# Patient Record
Sex: MALE | Race: BLACK OR AFRICAN AMERICAN | NOT HISPANIC OR LATINO | URBAN - METROPOLITAN AREA
[De-identification: names, ages, dates, MRNs, and addresses within clinical notes are randomized per-mention and may not be internally consistent; named-entity substitution may affect disease eponyms.]

---

## 2023-08-07 ENCOUNTER — OUTPATIENT (OUTPATIENT)
Dept: OUTPATIENT SERVICES | Facility: HOSPITAL | Age: 48
LOS: 1 days | Discharge: HOME | End: 2023-08-07

## 2023-08-07 VITALS
RESPIRATION RATE: 17 BRPM | TEMPERATURE: 98 F | HEART RATE: 64 BPM | OXYGEN SATURATION: 98 % | DIASTOLIC BLOOD PRESSURE: 86 MMHG | HEIGHT: 71 IN | SYSTOLIC BLOOD PRESSURE: 132 MMHG | WEIGHT: 173.28 LBS

## 2023-08-07 DIAGNOSIS — K40.90 UNILATERAL INGUINAL HERNIA, WITHOUT OBSTRUCTION OR GANGRENE, NOT SPECIFIED AS RECURRENT: ICD-10-CM

## 2023-08-07 DIAGNOSIS — Z01.818 ENCOUNTER FOR OTHER PREPROCEDURAL EXAMINATION: ICD-10-CM

## 2023-08-07 DIAGNOSIS — K40.30 UNILATERAL INGUINAL HERNIA, WITH OBSTRUCTION, WITHOUT GANGRENE, NOT SPECIFIED AS RECURRENT: ICD-10-CM

## 2023-08-07 DIAGNOSIS — Z01.812 ENCOUNTER FOR PREPROCEDURAL LABORATORY EXAMINATION: ICD-10-CM

## 2023-08-07 LAB
ANION GAP SERPL CALC-SCNC: 5 MMOL/L — SIGNIFICANT CHANGE UP (ref 5–17)
BASOPHILS # BLD AUTO: 0.03 K/UL — SIGNIFICANT CHANGE UP (ref 0–0.2)
BASOPHILS NFR BLD AUTO: 0.5 % — SIGNIFICANT CHANGE UP (ref 0–2)
BUN SERPL-MCNC: 14 MG/DL — SIGNIFICANT CHANGE UP (ref 7–23)
CALCIUM SERPL-MCNC: 8.6 MG/DL — SIGNIFICANT CHANGE UP (ref 8.5–10.1)
CHLORIDE SERPL-SCNC: 107 MMOL/L — SIGNIFICANT CHANGE UP (ref 96–108)
CO2 SERPL-SCNC: 29 MMOL/L — SIGNIFICANT CHANGE UP (ref 22–31)
CREAT SERPL-MCNC: 0.92 MG/DL — SIGNIFICANT CHANGE UP (ref 0.5–1.3)
EGFR: 102 ML/MIN/1.73M2 — SIGNIFICANT CHANGE UP
EOSINOPHIL # BLD AUTO: 0.12 K/UL — SIGNIFICANT CHANGE UP (ref 0–0.5)
EOSINOPHIL NFR BLD AUTO: 1.8 % — SIGNIFICANT CHANGE UP (ref 0–6)
GLUCOSE SERPL-MCNC: 95 MG/DL — SIGNIFICANT CHANGE UP (ref 70–99)
HCT VFR BLD CALC: 43.6 % — SIGNIFICANT CHANGE UP (ref 39–50)
HGB BLD-MCNC: 15.8 G/DL — SIGNIFICANT CHANGE UP (ref 13–17)
IMM GRANULOCYTES NFR BLD AUTO: 0.3 % — SIGNIFICANT CHANGE UP (ref 0–0.9)
LYMPHOCYTES # BLD AUTO: 2.24 K/UL — SIGNIFICANT CHANGE UP (ref 1–3.3)
LYMPHOCYTES # BLD AUTO: 33.6 % — SIGNIFICANT CHANGE UP (ref 13–44)
MCHC RBC-ENTMCNC: 27.3 PG — SIGNIFICANT CHANGE UP (ref 27–34)
MCHC RBC-ENTMCNC: 36.2 G/DL — HIGH (ref 32–36)
MCV RBC AUTO: 75.3 FL — LOW (ref 80–100)
MONOCYTES # BLD AUTO: 0.44 K/UL — SIGNIFICANT CHANGE UP (ref 0–0.9)
MONOCYTES NFR BLD AUTO: 6.6 % — SIGNIFICANT CHANGE UP (ref 2–14)
NEUTROPHILS # BLD AUTO: 3.81 K/UL — SIGNIFICANT CHANGE UP (ref 1.8–7.4)
NEUTROPHILS NFR BLD AUTO: 57.2 % — SIGNIFICANT CHANGE UP (ref 43–77)
NRBC # BLD: 0 /100 WBCS — SIGNIFICANT CHANGE UP (ref 0–0)
PLATELET # BLD AUTO: 199 K/UL — SIGNIFICANT CHANGE UP (ref 150–400)
POTASSIUM SERPL-MCNC: 3.9 MMOL/L — SIGNIFICANT CHANGE UP (ref 3.5–5.3)
POTASSIUM SERPL-SCNC: 3.9 MMOL/L — SIGNIFICANT CHANGE UP (ref 3.5–5.3)
RBC # BLD: 5.79 M/UL — SIGNIFICANT CHANGE UP (ref 4.2–5.8)
RBC # FLD: 14.4 % — SIGNIFICANT CHANGE UP (ref 10.3–14.5)
SODIUM SERPL-SCNC: 141 MMOL/L — SIGNIFICANT CHANGE UP (ref 135–145)
WBC # BLD: 6.66 K/UL — SIGNIFICANT CHANGE UP (ref 3.8–10.5)
WBC # FLD AUTO: 6.66 K/UL — SIGNIFICANT CHANGE UP (ref 3.8–10.5)

## 2023-08-07 NOTE — H&P PST ADULT - NSANTHOSAYNRD_GEN_A_CORE
states that he was screened for sleep apnea as a /No. RADHA screening performed.  STOP BANG Legend: 0-2 = LOW Risk; 3-4 = INTERMEDIATE Risk; 5-8 = HIGH Risk

## 2023-08-07 NOTE — H&P PST ADULT - HISTORY OF PRESENT ILLNESS
Mr Bernardo is a 47 y/o male with hx of BPH ? s/p urolift not on any medications presents with left groin discomfort for pre op examination for  repair incarcerated  left inguinal hernia with mesh on 8/21/2023 with Dr. Pryor.

## 2023-08-07 NOTE — H&P PST ADULT - PROBLEM SELECTOR PLAN 1
scheduled for surgery repair left inguinal hernia with mesh on 8/21/2023  advice to go to ER if groin discomfort worsens.

## 2023-08-07 NOTE — H&P PST ADULT - ASSESSMENT
Mr Bernardo is a 47 y/o male with hx of BPH ? s/p urolift not on any medications presents with left groin discomfort for pre op examination for  repair incarcerated  left inguinal hernia with mesh on 2023 with Dr. Pryor.     CAPRINI SCORE    AGE RELATED RISK FACTORS                                                       MOBILITY RELATED FACTORS  [x ] Age 41-60 years                                            (1 Point)                  [ ] Bed rest                                                        (1 Point)  [ ] Age: 61-74 years                                           (2 Points)                [ ] Plaster cast                                                   (2 Points)  [ ] Age= 75 years                                              (3 Points)                 [ ] Bed bound for more than 72 hours                   (2 Points)    DISEASE RELATED RISK FACTORS                                               GENDER SPECIFIC FACTORS  [ ] Edema in the lower extremities                       (1 Point)                  [ ] Pregnancy                                                     (1 Point)  [ ] Varicose veins                                               (1 Point)                  [ ] Post-partum < 6 weeks                                   (1 Point)             [ ] BMI > 25 Kg/m2                                            (1 Point)                  [ ] Hormonal therapy  or oral contraception            (1 Point)                 [ ] Sepsis (in the previous month)                        (1 Point)                  [ ] History of pregnancy complications  [ ] Pneumonia or serious lung disease                                               [ ] Unexplained or recurrent                       (1 Point)           (in the previous month)                               (1 Point)  [ ] Abnormal pulmonary function test                     (1 Point)                 SURGERY RELATED RISK FACTORS  [ ] Acute myocardial infarction                              (1 Point)                 [ ]  Section                                            (1 Point)  [ ] Congestive heart failure (in the previous month)  (1 Point)                 [ ] Minor surgery                                                 (1 Point)   [ ] Inflammatory bowel disease                             (1 Point)                 [ ] Arthroscopic surgery                                        (2 Points)  [ ] Central venous access                                    (2 Points)                [ x] General surgery lasting more than 45 minutes   (2 Points)       [ ] Stroke (in the previous month)                          (5 Points)               [ ] Elective arthroplasty                                        (5 Points)                                                                                                                                               HEMATOLOGY RELATED FACTORS                                                 TRAUMA RELATED RISK FACTORS  [ ] Prior episodes of VTE                                     (3 Points)                 [ ] Fracture of the hip, pelvis, or leg                       (5 Points)  [ ] Positive family history for VTE                         (3 Points)                 [ ] Acute spinal cord injury (in the previous month)  (5 Points)  [ ] Prothrombin 77064 A                                      (3 Points)                 [ ] Paralysis  (less than 1 month)                          (5 Points)  [ ] Factor V Leiden                                             (3 Points)                 [ ] Multiple Trauma within 1 month                         (5 Points)  [ ] Lupus anticoagulants                                     (3 Points)                                                           [ ] Anticardiolipin antibodies                                (3 Points)                                                       [ ] High homocysteine in the blood                      (3 Points)                                             [ ] Other congenital or acquired thrombophilia       (3 Points)                                                [ ] Heparin induced thrombocytopenia                  (3 Points)                                          Total Score [     3     ]    Caprini Score 0-2: Low risk, No VTE Prophylaxis required for most patient's, encourage ambulation  Caprini Score 3-6: At Risk, Pharmacologic VTE prophylaxis is indicated for most patients ( in the absence of a contraindication)  Caprini Score Greater than or = 7: High Risk , pharmacologic VTE is indicated for most patients ( in the absence of a contraindication)    Caprini score indicates that the patient is high risk for VTE event ( score 6 or greater). Surgical patient's in this group will benefit from both pharmacologic prophylaxis and intermittent compression devices . Surgical team will determine the balance between VTE  risk and bleeding risk and other clinical considerations

## 2023-08-08 RX ORDER — SODIUM CHLORIDE 9 MG/ML
3 INJECTION INTRAMUSCULAR; INTRAVENOUS; SUBCUTANEOUS EVERY 8 HOURS
Refills: 0 | Status: DISCONTINUED | OUTPATIENT
Start: 2023-08-21 | End: 2023-08-24

## 2023-08-20 ENCOUNTER — TRANSCRIPTION ENCOUNTER (OUTPATIENT)
Age: 48
End: 2023-08-20

## 2023-08-21 ENCOUNTER — TRANSCRIPTION ENCOUNTER (OUTPATIENT)
Age: 48
End: 2023-08-21

## 2023-08-21 ENCOUNTER — INPATIENT (INPATIENT)
Facility: HOSPITAL | Age: 48
LOS: 2 days | Discharge: HOME | End: 2023-08-24
Attending: STUDENT IN AN ORGANIZED HEALTH CARE EDUCATION/TRAINING PROGRAM | Admitting: STUDENT IN AN ORGANIZED HEALTH CARE EDUCATION/TRAINING PROGRAM
Payer: COMMERCIAL

## 2023-08-21 VITALS
RESPIRATION RATE: 18 BRPM | TEMPERATURE: 99 F | SYSTOLIC BLOOD PRESSURE: 126 MMHG | DIASTOLIC BLOOD PRESSURE: 82 MMHG | WEIGHT: 173.28 LBS | HEART RATE: 69 BPM | OXYGEN SATURATION: 97 % | HEIGHT: 71 IN

## 2023-08-21 DIAGNOSIS — Z90.79 ACQUIRED ABSENCE OF OTHER GENITAL ORGAN(S): Chronic | ICD-10-CM

## 2023-08-21 LAB
ANION GAP SERPL CALC-SCNC: 5 MMOL/L — SIGNIFICANT CHANGE UP (ref 5–17)
BUN SERPL-MCNC: 11 MG/DL — SIGNIFICANT CHANGE UP (ref 7–23)
CALCIUM SERPL-MCNC: 8.7 MG/DL — SIGNIFICANT CHANGE UP (ref 8.5–10.1)
CHLORIDE SERPL-SCNC: 108 MMOL/L — SIGNIFICANT CHANGE UP (ref 96–108)
CO2 SERPL-SCNC: 27 MMOL/L — SIGNIFICANT CHANGE UP (ref 22–31)
CREAT SERPL-MCNC: 0.98 MG/DL — SIGNIFICANT CHANGE UP (ref 0.5–1.3)
EGFR: 95 ML/MIN/1.73M2 — SIGNIFICANT CHANGE UP
GAS PNL BLDA: SIGNIFICANT CHANGE UP
GLUCOSE SERPL-MCNC: 138 MG/DL — HIGH (ref 70–99)
HCT VFR BLD CALC: 43.1 % — SIGNIFICANT CHANGE UP (ref 39–50)
HGB BLD-MCNC: 15 G/DL — SIGNIFICANT CHANGE UP (ref 13–17)
MAGNESIUM SERPL-MCNC: 2 MG/DL — SIGNIFICANT CHANGE UP (ref 1.6–2.6)
MCHC RBC-ENTMCNC: 26.5 PG — LOW (ref 27–34)
MCHC RBC-ENTMCNC: 34.8 G/DL — SIGNIFICANT CHANGE UP (ref 32–36)
MCV RBC AUTO: 76 FL — LOW (ref 80–100)
NRBC # BLD: 0 /100 WBCS — SIGNIFICANT CHANGE UP (ref 0–0)
PHOSPHATE SERPL-MCNC: 2.8 MG/DL — SIGNIFICANT CHANGE UP (ref 2.5–4.5)
PLATELET # BLD AUTO: 173 K/UL — SIGNIFICANT CHANGE UP (ref 150–400)
POTASSIUM SERPL-MCNC: 3.9 MMOL/L — SIGNIFICANT CHANGE UP (ref 3.5–5.3)
POTASSIUM SERPL-SCNC: 3.9 MMOL/L — SIGNIFICANT CHANGE UP (ref 3.5–5.3)
RBC # BLD: 5.67 M/UL — SIGNIFICANT CHANGE UP (ref 4.2–5.8)
RBC # FLD: 14.2 % — SIGNIFICANT CHANGE UP (ref 10.3–14.5)
SODIUM SERPL-SCNC: 140 MMOL/L — SIGNIFICANT CHANGE UP (ref 135–145)
WBC # BLD: 16.82 K/UL — HIGH (ref 3.8–10.5)
WBC # FLD AUTO: 16.82 K/UL — HIGH (ref 3.8–10.5)

## 2023-08-21 PROCEDURE — 71045 X-RAY EXAM CHEST 1 VIEW: CPT | Mod: 26

## 2023-08-21 PROCEDURE — ZZZZZ: CPT

## 2023-08-21 PROCEDURE — 99223 1ST HOSP IP/OBS HIGH 75: CPT

## 2023-08-21 PROCEDURE — 76604 US EXAM CHEST: CPT | Mod: 26

## 2023-08-21 PROCEDURE — 99253 IP/OBS CNSLTJ NEW/EST LOW 45: CPT | Mod: 25

## 2023-08-21 PROCEDURE — 93308 TTE F-UP OR LMTD: CPT | Mod: 26

## 2023-08-21 PROCEDURE — 71250 CT THORAX DX C-: CPT | Mod: 26

## 2023-08-21 PROCEDURE — 88302 TISSUE EXAM BY PATHOLOGIST: CPT | Mod: 26

## 2023-08-21 DEVICE — MESH HERNIA INGUINAL PARIETEX PROGRIP RECTANGLE 15 X 9CM: Type: IMPLANTABLE DEVICE | Site: LEFT | Status: FUNCTIONAL

## 2023-08-21 RX ORDER — ACETAMINOPHEN 500 MG
650 TABLET ORAL EVERY 6 HOURS
Refills: 0 | Status: DISCONTINUED | OUTPATIENT
Start: 2023-08-21 | End: 2023-08-24

## 2023-08-21 RX ORDER — IPRATROPIUM/ALBUTEROL SULFATE 18-103MCG
3 AEROSOL WITH ADAPTER (GRAM) INHALATION ONCE
Refills: 0 | Status: COMPLETED | OUTPATIENT
Start: 2023-08-21 | End: 2023-08-21

## 2023-08-21 RX ORDER — FUROSEMIDE 40 MG
40 TABLET ORAL ONCE
Refills: 0 | Status: COMPLETED | OUTPATIENT
Start: 2023-08-21 | End: 2023-08-21

## 2023-08-21 RX ORDER — HEPARIN SODIUM 5000 [USP'U]/ML
5000 INJECTION INTRAVENOUS; SUBCUTANEOUS EVERY 12 HOURS
Refills: 0 | Status: DISCONTINUED | OUTPATIENT
Start: 2023-08-21 | End: 2023-08-24

## 2023-08-21 RX ORDER — HYDROMORPHONE HYDROCHLORIDE 2 MG/ML
0.5 INJECTION INTRAMUSCULAR; INTRAVENOUS; SUBCUTANEOUS
Refills: 0 | Status: DISCONTINUED | OUTPATIENT
Start: 2023-08-21 | End: 2023-08-21

## 2023-08-21 RX ORDER — HYDROMORPHONE HYDROCHLORIDE 2 MG/ML
1 INJECTION INTRAMUSCULAR; INTRAVENOUS; SUBCUTANEOUS
Refills: 0 | Status: DISCONTINUED | OUTPATIENT
Start: 2023-08-21 | End: 2023-08-21

## 2023-08-21 RX ORDER — SODIUM CHLORIDE 9 MG/ML
1000 INJECTION, SOLUTION INTRAVENOUS
Refills: 0 | Status: DISCONTINUED | OUTPATIENT
Start: 2023-08-21 | End: 2023-08-21

## 2023-08-21 RX ORDER — OXYCODONE HYDROCHLORIDE 5 MG/1
5 TABLET ORAL EVERY 4 HOURS
Refills: 0 | Status: DISCONTINUED | OUTPATIENT
Start: 2023-08-21 | End: 2023-08-24

## 2023-08-21 RX ORDER — ONDANSETRON 8 MG/1
4 TABLET, FILM COATED ORAL ONCE
Refills: 0 | Status: DISCONTINUED | OUTPATIENT
Start: 2023-08-21 | End: 2023-08-21

## 2023-08-21 RX ORDER — FUROSEMIDE 40 MG
40 TABLET ORAL
Refills: 0 | Status: DISCONTINUED | OUTPATIENT
Start: 2023-08-22 | End: 2023-08-22

## 2023-08-21 RX ADMIN — SODIUM CHLORIDE 125 MILLILITER(S): 9 INJECTION, SOLUTION INTRAVENOUS at 11:40

## 2023-08-21 RX ADMIN — SODIUM CHLORIDE 3 MILLILITER(S): 9 INJECTION INTRAMUSCULAR; INTRAVENOUS; SUBCUTANEOUS at 22:00

## 2023-08-21 RX ADMIN — Medication 3 MILLILITER(S): at 12:39

## 2023-08-21 RX ADMIN — Medication 40 MILLIGRAM(S): at 16:27

## 2023-08-21 RX ADMIN — HEPARIN SODIUM 5000 UNIT(S): 5000 INJECTION INTRAVENOUS; SUBCUTANEOUS at 18:03

## 2023-08-21 NOTE — ASU DISCHARGE PLAN (ADULT/PEDIATRIC) - CARE PROVIDER_API CALL
Valerie Pryor  Surgery  [address] [place] [address]  Phone: [phone]  Fax: [phone]  Follow Up Time: 2 weeks

## 2023-08-21 NOTE — CONSULT NOTE ADULT - ASSESSMENT
48 yrs old male with PMH of BPH presented for inguinal hernia repair. Post op pt was hypoxic and was found to have pulm edema in CXR.    Acute hypoxic respiratory failure   2/2 acute pulmonary edema:  - Admit to tele  - CXR with BL infiltrate  - Can get CT chest for better assessment  - Lasix IVP once stat, continue 40 mg BID  - Stop IVF   - Fluid and salt restriction, daily wt  - 2D Echo  - On NC, taper down as tolerated     S/P inguinal hernia repair:  - Management sx team    DVT ppx per primary team
48M non smoker with PMH BPH, R inguinal hernia repair here s/p left inguinal hernia repair with post op course complicated by hypoxia (O2 sat 88%) and CXR with bilateral infiltrates.     POCUS   [x] limited echo  [x] limited lung    INDICATIONS  [x] SOB  [x] hypoxia    FINDINGS  - EF appears within normal limits, no pericardial effusion, unable to get good visualization of IVC  - bilateral a line predominance anteriorly, bilateral lung sliding, no pleural effusions noted      DX: acute post operative hypoxic respiratory failure    - POD #0  - CXR with bilateral opacities noted  - he did not have respiratory complaints prior to surgery and was oxygenating well   - he may have had while coming out of anesthesia possible laryngospasm and subsequent negative pressure pulmonary edema leading to SOB and hypoxia  - he does not appear ill or toxic appearing  - weaned off nasal cannula to room air upon during exam with O2 sat mid 90s on room air  - not in respiratory distress, no accessory muscle use  - lung ultrasound with no significant signs of interstitial edema/fluid  - there are no prior CXR to compare to  - he received lasix in PACU with improvement of symptoms and now weaned off O2 supplementation  - monitor I/O  - can give another dose of lasix if urine output decreasing  - pending CT chest and echo  - suspect pulmonary edema with sudden onset of SOB/hypoxia and now with relatively quick resolution of symptoms post diuresis   - would repeat CXR tomorrow   - can check sputum culture, RVP to rule out potential infectious etiologies of pulmonary opacities  - goal to maintain O2 sat > 90%  - incentive spirometer to prevent post op atelectasis  - DVT ppx

## 2023-08-21 NOTE — PROGRESS NOTE ADULT - SUBJECTIVE AND OBJECTIVE BOX
Post-op check    S/P left inguinal hernia repair with mesh POD#0  Pt seen and examined at bedside. Admits to incisional pain well controlled with medication. States that his breathing is improving. Voiding. Ambulating. Tolerating small portion of regular diet. Denies chest pain, shortness of breath, nausea/ vomiting, and dizziness.     Vital Signs Last 24 Hrs  T(F): 98.4 (08-21-23 @ 20:03), Max: 98.6 (08-21-23 @ 07:55)  HR: 69 (08-21-23 @ 20:03)  BP: 143/85 (08-21-23 @ 20:03)  RR: 18 (08-21-23 @ 20:03)  SpO2: 99% (08-21-23 @ 20:03)      CONSTITUTIONAL: Alert, NAD  RESPIRATORY: Clear to auscultation bilaterally, respirations nonlabored  CARDIOVASCULAR: S1S2, Regular rate and rhythm  GASTROINTESTINAL: Dressing C/D/I, Nondistended, bowel sounds, soft, Appropriate incisional tenderness  MUSCULOSKELETAL: No calf tenderness, No edema                          15.0   16.82 )-----------( 173      ( 21 Aug 2023 14:30 )             43.1   08-21    140  |  108  |  11  ----------------------------<  138<H>  3.9   |  27  |  0.98    Ca    8.7      21 Aug 2023 14:30  Phos  2.8     08-21  Mg     2.0     08-21        < from: CT Chest No Cont (08.21.23 @ 19:41) >  FINDINGS:    AIRWAYS, LUNGS, PLEURA: Clear central airways. Consolidation ground-glass   opacification involving all lung lobes. No pleural effusion.    MEDIASTINUM: Normal heart size. No pericardial effusion. Thoracic aorta   normal caliber.  No large mediastinal lymph nodes.    IMAGED ABDOMEN: Multiple hepatic lesions are indeterminate; reference   left hepatic lobe 5.5 cm lesion (image 126, series 7).    SOFT TISSUES: Unremarkable.    BONES: Unremarkable.      IMPRESSION:.    Extensive airspace disease involving all lung lobes; leading diagnostic   consideration is for infection.    Indeterminate hepatic lesions; recommend dedicated MRI abdomen to further   characterize.    < end of copied text >    < from: Xray Chest 1 View-PORTABLE IMMEDIATE (Xray Chest 1 View-PORTABLE IMMEDIATE .) (08.21.23 @ 13:46) >  IMPRESSION: Fairly advanced bilateral infiltrates.    < end of copied text >        Assessment: 48M S/P left inguinal hernia repair with mesh POD#0. Post-op pulmonary edema.     Plan:  - Local wound care  - DVT prophylaxis, Incentive Spirometer, OOB, Ambulating, pain control  - f/u labs   - Continue current management per medicine  - Follow up pulmonology recommendations   - No abx needed  - D/w Dr. Pryor

## 2023-08-21 NOTE — CONSULT NOTE ADULT - SUBJECTIVE AND OBJECTIVE BOX
HPI:  48M non smoker PMH R inguinal hernia repair, BPH, presents for L inguinal hernia repair. Post op pt became SOB. Denies fever, chills, sick contacts. States he felt his throat was tight and became short of breath and was "coughing alot". Denies prior lung problems, no hx of asthma. No respiratory complaints prior to surgery. CXR with bilateral infiltrates. O2 sat 88%. Was given lasix and placed on NRB -> nasal cannula 4L O2 supplementation. Currently reports feeling better and with decreased SOB.     No CP, no palpitations. No prior cardiac hx  No reported emesis post op      Allergies  No Known Allergies      MEDICATIONS  (STANDING):  heparin   Injectable 5000 Unit(s) SubCutaneous every 12 hours  sodium chloride 0.9% lock flush 3 milliLiter(s) IV Push every 8 hours    MEDICATIONS  (PRN):  acetaminophen     Tablet .. 650 milliGRAM(s) Oral every 6 hours PRN Temp greater or equal to 38C (100.4F), Mild Pain (1 - 3)  HYDROmorphone  Injectable 0.5 milliGRAM(s) IV Push every 10 minutes PRN Moderate Pain (4 - 6)  HYDROmorphone  Injectable 1 milliGRAM(s) IV Push every 10 minutes PRN Severe Pain (7 - 10)  ondansetron Injectable 4 milliGRAM(s) IV Push once PRN Nausea and/or Vomiting  oxyCODONE    IR 5 milliGRAM(s) Oral every 4 hours PRN Moderate Pain (4 - 6)        Daily Height in cm: 180.34 (21 Aug 2023 17:22)        Drug Dosing Weight  Height (cm): 180.3 (21 Aug 2023 17:22)  Weight (kg): 75.7 (21 Aug 2023 17:22)  BMI (kg/m2): 23.3 (21 Aug 2023 17:22)  BSA (m2): 1.95 (21 Aug 2023 17:22)    PAST MEDICAL & SURGICAL HISTORY:  Enlarged prostate  S/P TURP    R inguinal hernia repair      FAMILY HISTORY:  HTN    SOCIAL HISTORY:  no smoking  no alcohol  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 No nausea, vomiting, or hematemesis; No diarrhea or constipation. No melena or hematochezia.  GENITOURINARY: No dysuria, frequency, incontinence  NEUROLOGICAL: No headaches, memory loss, loss of strength, numbness, or tremors  SKIN: No itching, burning, rashes, or lesions   LYMPH NODES: No enlarged glands  ENDOCRINE: No heat or cold intolerance; No hair loss  MUSCULOSKELETAL: No joint pain or swelling; No muscle, back, or extremity pain  PSYCHIATRIC: No depression, anxiety, mood swings, or difficulty sleeping  HEME/LYMPH: No easy bruising, or bleeding gums  ALLERGY AND IMMUNOLOGIC: No hives or eczema          Vital Signs Last 24 Hrs  T(C): 36.7 (21 Aug 2023 17:22), Max: 37 (21 Aug 2023 07:55)  T(F): 98 (21 Aug 2023 17:22), Max: 98.6 (21 Aug 2023 07:55)  HR: 89 (21 Aug 2023 17:46) (69 - 96)  BP: 128/72 (21 Aug 2023 17:22) (114/78 - 144/86)  RR: 20 (21 Aug 2023 17:22) (14 - 28)  SpO2: 94% (21 Aug 2023 17:56) (88% - 100%)    Parameters below as of 21 Aug 2023 17:56  Patient On (Oxygen Delivery Method): room air            ABG - ( 21 Aug 2023 13:37 )  pH, Arterial: 7.39  /  pCO2: 46    /  pO2: 92    / HCO3: 28    / Base Excess: 2.2   /  SaO2: 99.2            PHYSICAL EXAM:  GENERAL: NAD, comfortable in bed, well-developed  HEAD:  Atraumatic, Normocephalic  EYES: EOMI, conjunctiva and sclera clear  ENMT: No tonsillar erythema, exudates, or enlargement; Moist mucous membranes, No lesions  NECK: Supple, No JVD  NERVOUS SYSTEM:  Alert & Oriented X3, Good concentration; Motor Strength 5/5 B/L upper and lower extremities  CHEST/LUNG: Clear to auscultation bilaterally; No rales, rhonchi, wheezing  HEART: Regular rate and rhythm  ABDOMEN: Soft, Nondistended; Bowel sounds present, L groin with dressing in place  EXTREMITIES:  2+ Peripheral Pulses, No clubbing, cyanosis, or edema  SKIN: No rashes or lesions    LABS:                          15.0   16.82 )-----------( 173      ( 21 Aug 2023 14:30 )             43.1       08-21    140  |  108  |  11  ----------------------------<  138<H>  3.9   |  27  |  0.98    Ca    8.7      21 Aug 2023 14:30  Phos  2.8     08-21  Mg     2.0     08-21            RADIOLOGY:  CXR < from: Xray Chest 1 View-PORTABLE IMMEDIATE (Xray Chest 1 View-PORTABLE IMMEDIATE .) (08.21.23 @ 13:46) >  There are significant bilateral infiltrates throughout most of the lungs.    IMPRESSION: Fairly advanced bilateral infiltrates.          
HPI:  48 yrs old male with PMH of BPH presented for inguinal hernia repair. Post op pt was hypoxic and was found to have pulm edema in CXR.  Pt was admitted under sx service. Medicine was consulted for co management.  Pt denies any known cardiac and pulm history.    PAST MEDICAL & SURGICAL HISTORY:  No pertinent past medical history      Enlarged prostate      S/P TURP          REVIEW OF SYSTEMS:    CONSTITUTIONAL: No fever, weight loss, or fatigue  EYES: No eye pain, visual disturbances, or discharge  ENMT:  No difficulty hearing, tinnitus, vertigo; No sinus or throat pain  NECK: No pain or stiffness  BREASTS: No pain, masses, or nipple discharge  RESPIRATORY: + No shortness of breath, + orthopnea  CARDIOVASCULAR: No chest pain, palpitations, dizziness, or leg swelling  GASTROINTESTINAL: + surgical site soreness.  No nausea, vomiting, or hematemesis; No diarrhea or constipation. No melena or hematochezia.  GENITOURINARY: No dysuria, frequency, hematuria, or incontinence  NEUROLOGICAL: No headaches, memory loss, loss of strength, numbness, or tremors  SKIN: No itching, burning, rashes, or lesions   LYMPH NODES: No enlarged glands  ENDOCRINE: No heat or cold intolerance; No hair loss  MUSCULOSKELETAL: No joint pain or swelling; No muscle, back, or extremity pain  PSYCHIATRIC: No depression, anxiety, mood swings, or difficulty sleeping  HEME/LYMPH: No easy bruising, or bleeding gums  ALLERGY AND IMMUNOLOGIC: No hives or eczema      MEDICATIONS  (STANDING):  heparin   Injectable 5000 Unit(s) SubCutaneous every 12 hours  sodium chloride 0.9% lock flush 3 milliLiter(s) IV Push every 8 hours    MEDICATIONS  (PRN):  acetaminophen     Tablet .. 650 milliGRAM(s) Oral every 6 hours PRN Temp greater or equal to 38C (100.4F), Mild Pain (1 - 3)  HYDROmorphone  Injectable 0.5 milliGRAM(s) IV Push every 10 minutes PRN Moderate Pain (4 - 6)  HYDROmorphone  Injectable 1 milliGRAM(s) IV Push every 10 minutes PRN Severe Pain (7 - 10)  ondansetron Injectable 4 milliGRAM(s) IV Push once PRN Nausea and/or Vomiting  oxyCODONE    IR 5 milliGRAM(s) Oral every 4 hours PRN Moderate Pain (4 - 6)      Allergies    No Known Allergies    Intolerances        SOCIAL HISTORY:    FAMILY HISTORY:      Vital Signs Last 24 Hrs  T(C): 36.7 (21 Aug 2023 17:22), Max: 37 (21 Aug 2023 07:55)  T(F): 98 (21 Aug 2023 17:22), Max: 98.6 (21 Aug 2023 07:55)  HR: 89 (21 Aug 2023 17:46) (69 - 96)  BP: 128/72 (21 Aug 2023 17:22) (114/78 - 144/86)  BP(mean): --  RR: 20 (21 Aug 2023 17:22) (14 - 28)  SpO2: 94% (21 Aug 2023 17:56) (88% - 100%)    Parameters below as of 21 Aug 2023 17:56  Patient On (Oxygen Delivery Method): room air        PHYSICAL EXAM:    GENERAL: NAD  HEAD:  Atraumatic  EYES: PERRLA  ENMT: Mouth moist   NECK: Supple  NERVOUS SYSTEM: Awake , alert  CHEST/LUNG: + crackles  HEART: RRR  ABDOMEN: Soft, non tender  EXTREMITIES: no edema    SKIN: No rashes or lesions      LABS:                        15.0   16.82 )-----------( 173      ( 21 Aug 2023 14:30 )             43.1     08-21    140  |  108  |  11  ----------------------------<  138<H>  3.9   |  27  |  0.98    Ca    8.7      21 Aug 2023 14:30  Phos  2.8     08-21  Mg     2.0     08-21        Urinalysis Basic - ( 21 Aug 2023 14:30 )    Color: x / Appearance: x / SG: x / pH: x  Gluc: 138 mg/dL / Ketone: x  / Bili: x / Urobili: x   Blood: x / Protein: x / Nitrite: x   Leuk Esterase: x / RBC: x / WBC x   Sq Epi: x / Non Sq Epi: x / Bacteria: x        RADIOLOGY & ADDITIONAL STUDIES:

## 2023-08-21 NOTE — PATIENT PROFILE ADULT - FALL HARM RISK - HARM RISK INTERVENTIONS

## 2023-08-22 ENCOUNTER — TRANSCRIPTION ENCOUNTER (OUTPATIENT)
Age: 48
End: 2023-08-22

## 2023-08-22 LAB
ANION GAP SERPL CALC-SCNC: 9 MMOL/L — SIGNIFICANT CHANGE UP (ref 5–17)
BUN SERPL-MCNC: 15 MG/DL — SIGNIFICANT CHANGE UP (ref 7–23)
CALCIUM SERPL-MCNC: 9.1 MG/DL — SIGNIFICANT CHANGE UP (ref 8.5–10.1)
CHLORIDE SERPL-SCNC: 103 MMOL/L — SIGNIFICANT CHANGE UP (ref 96–108)
CO2 SERPL-SCNC: 26 MMOL/L — SIGNIFICANT CHANGE UP (ref 22–31)
CREAT SERPL-MCNC: 1.16 MG/DL — SIGNIFICANT CHANGE UP (ref 0.5–1.3)
EGFR: 77 ML/MIN/1.73M2 — SIGNIFICANT CHANGE UP
GLUCOSE SERPL-MCNC: 102 MG/DL — HIGH (ref 70–99)
HCT VFR BLD CALC: 42.5 % — SIGNIFICANT CHANGE UP (ref 39–50)
HGB BLD-MCNC: 15 G/DL — SIGNIFICANT CHANGE UP (ref 13–17)
MAGNESIUM SERPL-MCNC: 2.2 MG/DL — SIGNIFICANT CHANGE UP (ref 1.6–2.6)
MCHC RBC-ENTMCNC: 26.8 PG — LOW (ref 27–34)
MCHC RBC-ENTMCNC: 35.3 G/DL — SIGNIFICANT CHANGE UP (ref 32–36)
MCV RBC AUTO: 76 FL — LOW (ref 80–100)
NRBC # BLD: 0 /100 WBCS — SIGNIFICANT CHANGE UP (ref 0–0)
NT-PROBNP SERPL-SCNC: 32 PG/ML — SIGNIFICANT CHANGE UP (ref 0–125)
PHOSPHATE SERPL-MCNC: 3.7 MG/DL — SIGNIFICANT CHANGE UP (ref 2.5–4.5)
PLATELET # BLD AUTO: 168 K/UL — SIGNIFICANT CHANGE UP (ref 150–400)
POTASSIUM SERPL-MCNC: 3.5 MMOL/L — SIGNIFICANT CHANGE UP (ref 3.5–5.3)
POTASSIUM SERPL-SCNC: 3.5 MMOL/L — SIGNIFICANT CHANGE UP (ref 3.5–5.3)
RAPID RVP RESULT: SIGNIFICANT CHANGE UP
RBC # BLD: 5.59 M/UL — SIGNIFICANT CHANGE UP (ref 4.2–5.8)
RBC # FLD: 14.3 % — SIGNIFICANT CHANGE UP (ref 10.3–14.5)
SARS-COV-2 RNA SPEC QL NAA+PROBE: SIGNIFICANT CHANGE UP
SODIUM SERPL-SCNC: 138 MMOL/L — SIGNIFICANT CHANGE UP (ref 135–145)
SURGICAL PATHOLOGY STUDY: SIGNIFICANT CHANGE UP
WBC # BLD: 12.69 K/UL — HIGH (ref 3.8–10.5)
WBC # FLD AUTO: 12.69 K/UL — HIGH (ref 3.8–10.5)

## 2023-08-22 PROCEDURE — 99233 SBSQ HOSP IP/OBS HIGH 50: CPT

## 2023-08-22 PROCEDURE — 71045 X-RAY EXAM CHEST 1 VIEW: CPT | Mod: 26

## 2023-08-22 RX ADMIN — OXYCODONE HYDROCHLORIDE 5 MILLIGRAM(S): 5 TABLET ORAL at 21:00

## 2023-08-22 RX ADMIN — HEPARIN SODIUM 5000 UNIT(S): 5000 INJECTION INTRAVENOUS; SUBCUTANEOUS at 17:32

## 2023-08-22 RX ADMIN — OXYCODONE HYDROCHLORIDE 5 MILLIGRAM(S): 5 TABLET ORAL at 10:36

## 2023-08-22 RX ADMIN — SODIUM CHLORIDE 3 MILLILITER(S): 9 INJECTION INTRAMUSCULAR; INTRAVENOUS; SUBCUTANEOUS at 22:31

## 2023-08-22 RX ADMIN — OXYCODONE HYDROCHLORIDE 5 MILLIGRAM(S): 5 TABLET ORAL at 09:36

## 2023-08-22 RX ADMIN — OXYCODONE HYDROCHLORIDE 5 MILLIGRAM(S): 5 TABLET ORAL at 20:06

## 2023-08-22 RX ADMIN — Medication 40 MILLIGRAM(S): at 13:50

## 2023-08-22 RX ADMIN — Medication 40 MILLIGRAM(S): at 05:09

## 2023-08-22 RX ADMIN — HEPARIN SODIUM 5000 UNIT(S): 5000 INJECTION INTRAVENOUS; SUBCUTANEOUS at 05:08

## 2023-08-22 NOTE — DISCHARGE NOTE PROVIDER - NSDCCPCAREPLAN_GEN_ALL_CORE_FT
PRINCIPAL DISCHARGE DIAGNOSIS  Diagnosis: Left inguinal hernia  Assessment and Plan of Treatment:

## 2023-08-22 NOTE — DISCHARGE NOTE PROVIDER - NSDCFUADDINST_GEN_ALL_CORE_FT
Activity as tolerated. Rest as needed. Do not lift anything heavier than 10 pounds. Do not drive while taking narcotic pain medication. Call for any fever over 101, nausea, vomiting, severe pain, no passing of gas or bowel movement. You may shower and pat dry abdomen. Leave the white steri strips in place; they will fall off in 5-7 days.

## 2023-08-22 NOTE — PROGRESS NOTE ADULT - SUBJECTIVE AND OBJECTIVE BOX
24 hr events:  doing well  decreased SOB  decreased cough  no sputum production  no fever no chills  was weaned to RA yesterday  reportedly O2 sat dropped to 92% yesterday night and was placed back on nasal cannula supplementation  placed on RA today O2 sat 98% at rest    ## ROS:  no fever, no chills  no HA, no dizziness  no visual changes, no auditory changes  + sore throat, no sinus congestion  decreased SOB, decreased (non productive) cough  no chest pain, no palpitations  no abdominal pain, no N/V/D  no dysuria, no hematuria  no myalgias, no arthralgias  no swelling  no rashes, no pruritis    ## Labs:  CBC:                        15.0   12.69 )-----------( 168      ( 22 Aug 2023 06:47 )             42.5     Chem:  08-22    138  |  103  |  15  ----------------------------<  102<H>  3.5   |  26  |  1.16    Ca    9.1      22 Aug 2023 06:47  Phos  3.7     08-22  Mg     2.2     08-22      ## Imaging:  CXR < from: Xray Chest 1 View-PORTABLE IMMEDIATE (Xray Chest 1 View-PORTABLE IMMEDIATE .) (08.21.23 @ 13:46) >  Heart magnified by technique.    There are significant bilateral infiltrates throughout most of the lungs.    IMPRESSION: Fairly advanced bilateral infiltrates.    -------------------  < from: CT Chest No Cont (08.21.23 @ 19:41) >  AIRWAYS, LUNGS, PLEURA: Clear central airways. Consolidation ground-glass   opacification involving all lung lobes. No pleural effusion.    MEDIASTINUM: Normal heart size. No pericardial effusion. Thoracic aorta   normal caliber.  No large mediastinal lymph nodes.    IMAGED ABDOMEN: Multiple hepatic lesions are indeterminate; reference   left hepatic lobe 5.5 cm lesion (image 126, series 7).    SOFT TISSUES: Unremarkable.    BONES: Unremarkable.      IMPRESSION:.    Extensive airspace disease involving all lung lobes; leading diagnostic   consideration is for infection.    Indeterminate hepatic lesions; recommend dedicated MRI abdomen to further   characterize.    --------------------------    < from: TTE Echo Complete w/o Contrast w/ Doppler (08.22.23 @ 13:05) >  Left Ventricle: The left ventricular internal cavity size is normal. Left   ventricular wall thickness is mildly increased. Increased relative wall   thickness with normal mass index consistent with left ventricular   concentric remodeling.  Global LV systolic function was normal. Leftventricular ejection   fraction, by visual estimation, is 55 to 60%. Spectral Doppler shows   impaired relaxation pattern of left ventricular myocardial filling (Grade   I diastolic dysfunction).  Right Ventricle: Normal right ventricular size and function.  Left Atrium: Normal left atrial size.  Right Atrium: Normal right atrial size.  Pericardium: There is no evidence of pericardial effusion.  Mitral Valve: Structurally normal mitral valve, with normal leaflet   excursion. Mitral leaflet mobility is normal. Trace mitral valve   regurgitation is seen.  Tricuspid Valve: Structurally normal tricuspid valve, with normal leaflet   excursion. Trivial tricuspid regurgitation is visualized. Estimated   pulmonary artery systolic pressure is 36.8 mmHg assuming a right atrial   pressure of 5 mmHg, which is consistent with borderline pulmonary   hypertension.  Aortic Valve: The aortic valve was not well visualized. The aortic valve   is trileaflet. Mild aortic valve regurgitation is seen.  PulmonicValve: The pulmonic valve was not well visualized. Trace   pulmonic valve regurgitation.  Aorta: Aortic root measured at Sinus of Valsalva is normal.  Venous: The inferior vena cava was normal sized, with respiratory size   variation greater than 50%.      Summary:   1. Left ventricular ejection fraction, by visual estimation, is 55 to 60%.   2. Technically adequate study.   3. Normal global left ventricular systolic function.   4. Mildly increased LV wall thickness.   5. Normal left ventricular internal cavity size.   6. Spectral Doppler shows impaired relaxation pattern of left ventricular myocardial filling (Grade I diastolic dysfunction).   7. Normal right ventricular size and function.   8. Normal left atrial size.   9. Normal right atrial size.  10. There is no evidence of pericardial effusion.  11. Structurally normal mitral valve, with normal leaflet excursion.  12. Trace mitral valve regurgitation.  13. Mild aortic regurgitation.  14. Estimated pulmonary artery systolic pressure is 36.8 mmHg assuming a   right atrial pressure of 5 mmHg, which is consistent with borderline   pulmonary hypertension.  15. Increased relative wall thickness with normal mass index consistent with left ventricular concentric remodeling.      ## Medications:  heparin   Injectable 5000 Unit(s) SubCutaneous every 12 hours      acetaminophen     Tablet .. 650 milliGRAM(s) Oral every 6 hours PRN  oxyCODONE    IR 5 milliGRAM(s) Oral every 4 hours PRN      ## Vitals:  T(C): 36.6 (08-22-23 @ 16:22), Max: 37 (08-21-23 @ 18:44)  HR: 71 (08-22-23 @ 16:22) (69 - 89)  BP: 133/85 (08-22-23 @ 16:22) (120/80 - 143/85)  RR: 18 (08-22-23 @ 16:22) (16 - 20)  SpO2: 95% (08-22-23 @ 16:22) (93% - 99%)    ABG: ABG - ( 21 Aug 2023 13:37 )  pH, Arterial: 7.39  /  pCO2: 46    /  pO2: 92    / HCO3: 28    / Base Excess: 2.2   /  SaO2: 99.2                  08-21 @ 07:01  -  08-22 @ 07:00  --------------------------------------------------------  IN: 0 mL / OUT: 1650 mL / NET: -1650 mL            ## P/E:  Gen: lying comfortably in bed in no apparent distress  HEENT: NC/AT, EOMI, sclera white  Resp: CTA B/L , no wheeze, no rhonchi  CVS: RRR  Abd: soft NT/ND +BS, L groin dressing in place  Ext: no c/c/e  Neuro: A&Ox3

## 2023-08-22 NOTE — DISCHARGE NOTE PROVIDER - NSDCCONDITION_GEN_ALL_CORE
Stable
most likely cellulitis of the left side of the face no fluctuance + induration; discussed with pt and wife in detail of starting antibiotics 1st see if symptoms improve and follow up in 2 days with either pmd for back to the ed to make sure area getting better; less likely abscess at this time as no fluctuance vs maybe very early abscess --offered ct of face -pt and wife would like a trial of abx first and note if will get worse will come back for ct at that time, no systemic symptoms at this time no fever. no lesions to indicate shingles but does complain of scalp sensitivity --understands to come back if any lesions develop. --dc

## 2023-08-22 NOTE — PROGRESS NOTE ADULT - SUBJECTIVE AND OBJECTIVE BOX
Patient seen and  examined at bedside resting comfortably.   Offers no complaints at this time, states he is breathing normally. Off NC.   Tolerating regular diet. With bowel function.   Denies fever, chills, N/V/D, CP, SOB.     Vital Signs Last 24 Hrs  T(F): 97.6 (08-22-23 @ 10:18), Max: 98.6 (08-21-23 @ 18:44)  HR: 70 (08-22-23 @ 10:18)  BP: 124/85 (08-22-23 @ 10:18)  RR: 16 (08-22-23 @ 10:18)  SpO2: 97% (08-22-23 @ 10:18)    PHYSICAL EXAM:  GENERAL: Alert, NAD  CHEST/LUNG: Clear to auscultation bilaterally, respirations nonlabored  HEART: Regular rate and rhythm; S1 & S2 appreciated  ABDOMEN: + Bowel sounds, soft, Nontender, softly distended. LIH dressing in place   EXTREMITIES:  no calf tenderness, No edema    I&O's Detail    21 Aug 2023 07:01  -  22 Aug 2023 07:00  --------------------------------------------------------  IN:  Total IN: 0 mL    OUT:    Voided (mL): 1650 mL  Total OUT: 1650 mL    Total NET: -1650 mL      22 Aug 2023 07:01  -  22 Aug 2023 12:43  --------------------------------------------------------  IN:  Total IN: 0 mL    OUT:    Voided (mL): 1000 mL  Total OUT: 1000 mL    Total NET: -1000 mL    LABS:                        15.0   12.69 )-----------( 168      ( 22 Aug 2023 06:47 )             42.5     08-22    138  |  103  |  15  ----------------------------<  102<H>  3.5   |  26  |  1.16    Ca    9.1      22 Aug 2023 06:47  Phos  3.7     08-22  Mg     2.2     08-22      RADIOLOGY & ADDITIONAL STUDIES:  < from: CT Chest No Cont (08.21.23 @ 19:41) >  ACC: [id number] EXAM:  CT CHEST   ORDERED BY: AMIRA FONTENOT     PROCEDURE DATE:  08/21/2023          INTERPRETATION:  HISTORY: Rule out pulmonary edema. Infiltrate on chest   x-ray.    EXAMINATION: CT CHEST was performed without IV contrast.    COMPARISON: No prior CT chest comparison.    FINDINGS:    AIRWAYS, LUNGS, PLEURA: Clear central airways. Consolidation ground-glass   opacification involving all lung lobes. No pleural effusion.    MEDIASTINUM: Normal heart size. No pericardial effusion. Thoracic aorta   normal caliber.  No large mediastinal lymph nodes.    IMAGED ABDOMEN: Multiple hepatic lesions are indeterminate; reference   left hepatic lobe 5.5 cm lesion (image 126, series 7).    SOFT TISSUES: Unremarkable.    BONES: Unremarkable.      IMPRESSION:.    Extensive airspace disease involving all lung lobes; leading diagnostic   consideration is for infection.    Indeterminate hepatic lesions; recommend dedicated MRI abdomen to further   characterize.    A/P  48M S/P left inguinal hernia repair with mesh POD#1, with post-op pulmonary edema.   - continue lasix 40 BID   -  Patient seen and  examined at bedside resting comfortably.   Offers no complaints at this time, states he is breathing normally. Off NC.   Tolerating regular diet. With bowel function.   Denies fever, chills, N/V/D, CP, SOB.     Vital Signs Last 24 Hrs  T(F): 97.6 (08-22-23 @ 10:18), Max: 98.6 (08-21-23 @ 18:44)  HR: 70 (08-22-23 @ 10:18)  BP: 124/85 (08-22-23 @ 10:18)  RR: 16 (08-22-23 @ 10:18)  SpO2: 97% (08-22-23 @ 10:18)    PHYSICAL EXAM:  GENERAL: Alert, NAD  CHEST/LUNG: Clear to auscultation bilaterally, respirations nonlabored  HEART: Regular rate and rhythm; S1 & S2 appreciated  ABDOMEN: + Bowel sounds, soft, Nontender, softly distended. LIH dressing in place   EXTREMITIES:  no calf tenderness, No edema    I&O's Detail    21 Aug 2023 07:01  -  22 Aug 2023 07:00  --------------------------------------------------------  IN:  Total IN: 0 mL    OUT:    Voided (mL): 1650 mL  Total OUT: 1650 mL    Total NET: -1650 mL      22 Aug 2023 07:01  -  22 Aug 2023 12:43  --------------------------------------------------------  IN:  Total IN: 0 mL    OUT:    Voided (mL): 1000 mL  Total OUT: 1000 mL    Total NET: -1000 mL    LABS:                        15.0   12.69 )-----------( 168      ( 22 Aug 2023 06:47 )             42.5     08-22    138  |  103  |  15  ----------------------------<  102<H>  3.5   |  26  |  1.16    Ca    9.1      22 Aug 2023 06:47  Phos  3.7     08-22  Mg     2.2     08-22      RADIOLOGY & ADDITIONAL STUDIES:  < from: CT Chest No Cont (08.21.23 @ 19:41) >  ACC: [id number] EXAM:  CT CHEST   ORDERED BY: AMIRA FONTENOT     PROCEDURE DATE:  08/21/2023          INTERPRETATION:  HISTORY: Rule out pulmonary edema. Infiltrate on chest   x-ray.    EXAMINATION: CT CHEST was performed without IV contrast.    COMPARISON: No prior CT chest comparison.    FINDINGS:    AIRWAYS, LUNGS, PLEURA: Clear central airways. Consolidation ground-glass   opacification involving all lung lobes. No pleural effusion.    MEDIASTINUM: Normal heart size. No pericardial effusion. Thoracic aorta   normal caliber.  No large mediastinal lymph nodes.    IMAGED ABDOMEN: Multiple hepatic lesions are indeterminate; reference   left hepatic lobe 5.5 cm lesion (image 126, series 7).    SOFT TISSUES: Unremarkable.    BONES: Unremarkable.      IMPRESSION:.    Extensive airspace disease involving all lung lobes; leading diagnostic   consideration is for infection.    Indeterminate hepatic lesions; recommend dedicated MRI abdomen to further   characterize.    A/P  48M S/P left inguinal hernia repair with mesh POD#1, with post-op pulmonary edema.   - continue lasix 40 BID   - f/u echo  - analgesia prn   - continue regular diet  - DVT ppx, OOB/AAT  - f/u final pulm and medicine recs  - will need OP f/u with hematology for hepatic lesions   - d/c planning  - d/w Dr. Pryor

## 2023-08-22 NOTE — PROGRESS NOTE ADULT - SUBJECTIVE AND OBJECTIVE BOX
Patient is a 48y old  Male who presents with a chief complaint of post operative hypoxia (22 Aug 2023 16:00)      INTERVAL HPI/OVERNIGHT EVENTS:  Pt was seen and examined earlier today.  Event form this afternoon noted.    MEDICATIONS  (STANDING):  heparin   Injectable 5000 Unit(s) SubCutaneous every 12 hours  sodium chloride 0.9% lock flush 3 milliLiter(s) IV Push every 8 hours    MEDICATIONS  (PRN):  acetaminophen     Tablet .. 650 milliGRAM(s) Oral every 6 hours PRN Temp greater or equal to 38C (100.4F), Mild Pain (1 - 3)  oxyCODONE    IR 5 milliGRAM(s) Oral every 4 hours PRN Moderate Pain (4 - 6)      Allergies    No Known Allergies    Intolerances          Vital Signs Last 24 Hrs  T(C): 36.6 (22 Aug 2023 16:22), Max: 36.8 (21 Aug 2023 23:29)  T(F): 97.8 (22 Aug 2023 16:22), Max: 98.3 (21 Aug 2023 23:29)  HR: 71 (22 Aug 2023 16:22) (70 - 77)  BP: 133/85 (22 Aug 2023 16:22) (120/80 - 133/85)  BP(mean): --  RR: 18 (22 Aug 2023 16:22) (16 - 18)  SpO2: 95% (22 Aug 2023 16:22) (95% - 99%)    Parameters below as of 22 Aug 2023 16:22  Patient On (Oxygen Delivery Method): room air        PHYSICAL EXAM:  GENERAL: NAD  HEAD:  Atraumatic  EYES: PERRLA  ENMT: Mouth moist   NECK: Supple  NERVOUS SYSTEM: Awake , alert  CHEST/LUNG: Clear  HEART: RRR  ABDOMEN: Soft, non tender  EXTREMITIES: no edema    SKIN: No rashes or lesions        LABS:                        15.0   12.69 )-----------( 168      ( 22 Aug 2023 06:47 )             42.5     08-22    138  |  103  |  15  ----------------------------<  102<H>  3.5   |  26  |  1.16    Ca    9.1      22 Aug 2023 06:47  Phos  3.7     08-22  Mg     2.2     08-22        Urinalysis Basic - ( 22 Aug 2023 06:47 )    Color: x / Appearance: x / SG: x / pH: x  Gluc: 102 mg/dL / Ketone: x  / Bili: x / Urobili: x   Blood: x / Protein: x / Nitrite: x   Leuk Esterase: x / RBC: x / WBC x   Sq Epi: x / Non Sq Epi: x / Bacteria: x      CAPILLARY BLOOD GLUCOSE        RADIOLOGY & ADDITIONAL TESTS:    Imaging Personally Reviewed:  [ ] YES  [ ] NO    Consultant(s) Notes Reviewed:  [ ] YES  [ ] NO    Care Discussed with Consultants/Other Providers [ ] YES  [ ] NO

## 2023-08-22 NOTE — DISCHARGE NOTE PROVIDER - ATTENDING DISCHARGE PHYSICAL EXAMINATION:
General: NAD  Neuro:  Alert & oriented x 3  HEENT: NCAT, EOMI, conjunctiva clear  CV: +S1+S2 regular rate and rhythm  Lung:respirations nonlabored, good inspiratory effort  Abdomen: soft, mildly distended, nontender. lower abdominal incision with steristrips C/D/I  Extremities: no pedal edema or calf tenderness noted

## 2023-08-22 NOTE — DISCHARGE NOTE PROVIDER - NSFOLLOWUPCLINICS_GEN_ALL_ED_FT
[facility]  Pulmonology  [address] [place] [address], [place] [address]  Phone: [phone]  Fax:      [facility]  Pulmonology  [address], [address]  [place] [address]  Phone: [phone]  Fax:     [facility]  Hematology/Oncology  [address] [id number]  Phone: [phone]  Fax:   Follow Up Time: 1 week

## 2023-08-22 NOTE — CHART NOTE - NSCHARTNOTEFT_GEN_A_CORE
General Surgery PA Note    Called by RN, patient syncopsized after urinating in the bathroom. Per patient he remembers standing and urinating, then lifted his head to stand up straight when he blacked out. He was found sitting on the floor by RN Citlaly. Patient remembers waking up with sweat on his forehead. Patient walked with assistance back to bed. Telemetry at the time with HR 36.    T(C): , Max: 37 (08-21-23 @ 18:44)  HR: 71 (08-22-23 @ 16:22) (69 - 89)  BP: 133/85 (08-22-23 @ 16:22) (120/80 - 143/85)  RR: 18 (08-22-23 @ 16:22) (16 - 20)  SpO2: 95% (08-22-23 @ 16:22) (93% - 99%)    Gen: NAD  HEENT: No bruising of head, no swelling noted, non-tender to palpation  HR: S1S2 HR 71  Cardiac: Good inspiratory effort, no respiratory distress  Extremities: LUE with abrasion to elbow    A/P  48 M s/p left inguinal hernia repair with mesh POD #1, complicated by post op pulm edema secondary to extubation, not fluid overload  Repeat chest xray shows improvement of pulmonary edema  Will therefore DC IV lasix  TTE reviewed  Will continue to monitor Oxygen saturation and telemetry overnight  Follow up final pulmonary and medicine recommendations  continue regular diet  VTE prophylaxis, analgesia PRN  DC planning  Discussed with Dr. Pryor

## 2023-08-22 NOTE — DISCHARGE NOTE PROVIDER - NSDCFUADDAPPT_GEN_ALL_CORE_FT
Follow up within 2 weeks with Dr. Pryor and Dr. Barker. Please call the offices to make appointments.     Please follow up with your primary care physician regarding your hospitalization. Please schedule an appointment with your primary care provider within two weeks after your discharge to review your hospital course.

## 2023-08-22 NOTE — DISCHARGE NOTE PROVIDER - HOSPITAL COURSE
48M S/P left inguinal hernia repair with mesh 8/21/23. Pt had post-op pulmonary edema and was admitted. Medicine and pulmonology were consulted. Pt was placed on Lasix and had a vasovagal response so it was d/c'd. Pt tolerated regular diet well. At the time of discharge, the patient was hemodynamically stable, was tolerating PO diet, was voiding urine, was ambulating, and was comfortable with adequate pain control.

## 2023-08-22 NOTE — DISCHARGE NOTE PROVIDER - NSDCMRMEDTOKEN_GEN_ALL_CORE_FT
acetaminophen 325 mg oral tablet: 2 tab(s) orally every 8 hours as needed for  mild pain   acetaminophen 325 mg oral tablet: 2 tab(s) orally every 8 hours as needed for  mild pain  Percocet 5 mg-325 mg oral tablet: 1 tab(s) orally every 4 hours as needed for  moderate pain MDD: 6

## 2023-08-22 NOTE — DISCHARGE NOTE PROVIDER - CARE PROVIDER_API CALL
Valerie Pryor  Surgery  [address] [place] [address]  Phone: [phone]  Fax: [phone]  Follow Up Time:     Saad Barker  Gastroenterology  [address] [place] [id number]  Phone: [phone]  Fax: [phone]  Follow Up Time:

## 2023-08-22 NOTE — DISCHARGE NOTE PROVIDER - NSDCFUSCHEDAPPT_GEN_ALL_CORE_FT
Subha El  [facility]  PULED [address] [place] [address]  Scheduled Appointment: 08/28/2023

## 2023-08-23 LAB
ANION GAP SERPL CALC-SCNC: 4 MMOL/L — LOW (ref 5–17)
BUN SERPL-MCNC: 17 MG/DL — SIGNIFICANT CHANGE UP (ref 7–23)
CALCIUM SERPL-MCNC: 8.7 MG/DL — SIGNIFICANT CHANGE UP (ref 8.5–10.1)
CHLORIDE SERPL-SCNC: 102 MMOL/L — SIGNIFICANT CHANGE UP (ref 96–108)
CO2 SERPL-SCNC: 31 MMOL/L — SIGNIFICANT CHANGE UP (ref 22–31)
CREAT SERPL-MCNC: 0.98 MG/DL — SIGNIFICANT CHANGE UP (ref 0.5–1.3)
EGFR: 95 ML/MIN/1.73M2 — SIGNIFICANT CHANGE UP
GLUCOSE SERPL-MCNC: 96 MG/DL — SIGNIFICANT CHANGE UP (ref 70–99)
HCT VFR BLD CALC: 41.3 % — SIGNIFICANT CHANGE UP (ref 39–50)
HGB BLD-MCNC: 14.3 G/DL — SIGNIFICANT CHANGE UP (ref 13–17)
MAGNESIUM SERPL-MCNC: 2.3 MG/DL — SIGNIFICANT CHANGE UP (ref 1.6–2.6)
MCHC RBC-ENTMCNC: 26.2 PG — LOW (ref 27–34)
MCHC RBC-ENTMCNC: 34.6 G/DL — SIGNIFICANT CHANGE UP (ref 32–36)
MCV RBC AUTO: 75.6 FL — LOW (ref 80–100)
NRBC # BLD: 0 /100 WBCS — SIGNIFICANT CHANGE UP (ref 0–0)
PHOSPHATE SERPL-MCNC: 4 MG/DL — SIGNIFICANT CHANGE UP (ref 2.5–4.5)
PLATELET # BLD AUTO: 168 K/UL — SIGNIFICANT CHANGE UP (ref 150–400)
POTASSIUM SERPL-MCNC: 3.6 MMOL/L — SIGNIFICANT CHANGE UP (ref 3.5–5.3)
POTASSIUM SERPL-SCNC: 3.6 MMOL/L — SIGNIFICANT CHANGE UP (ref 3.5–5.3)
RBC # BLD: 5.46 M/UL — SIGNIFICANT CHANGE UP (ref 4.2–5.8)
RBC # FLD: 14.4 % — SIGNIFICANT CHANGE UP (ref 10.3–14.5)
SODIUM SERPL-SCNC: 137 MMOL/L — SIGNIFICANT CHANGE UP (ref 135–145)
WBC # BLD: 10.77 K/UL — HIGH (ref 3.8–10.5)
WBC # FLD AUTO: 10.77 K/UL — HIGH (ref 3.8–10.5)

## 2023-08-23 PROCEDURE — 99233 SBSQ HOSP IP/OBS HIGH 50: CPT

## 2023-08-23 PROCEDURE — 99232 SBSQ HOSP IP/OBS MODERATE 35: CPT

## 2023-08-23 RX ADMIN — HEPARIN SODIUM 5000 UNIT(S): 5000 INJECTION INTRAVENOUS; SUBCUTANEOUS at 05:35

## 2023-08-23 RX ADMIN — OXYCODONE HYDROCHLORIDE 5 MILLIGRAM(S): 5 TABLET ORAL at 11:30

## 2023-08-23 RX ADMIN — SODIUM CHLORIDE 3 MILLILITER(S): 9 INJECTION INTRAMUSCULAR; INTRAVENOUS; SUBCUTANEOUS at 21:20

## 2023-08-23 RX ADMIN — OXYCODONE HYDROCHLORIDE 5 MILLIGRAM(S): 5 TABLET ORAL at 12:30

## 2023-08-23 RX ADMIN — SODIUM CHLORIDE 3 MILLILITER(S): 9 INJECTION INTRAMUSCULAR; INTRAVENOUS; SUBCUTANEOUS at 05:47

## 2023-08-23 RX ADMIN — SODIUM CHLORIDE 3 MILLILITER(S): 9 INJECTION INTRAMUSCULAR; INTRAVENOUS; SUBCUTANEOUS at 14:45

## 2023-08-23 RX ADMIN — OXYCODONE HYDROCHLORIDE 5 MILLIGRAM(S): 5 TABLET ORAL at 21:31

## 2023-08-23 RX ADMIN — HEPARIN SODIUM 5000 UNIT(S): 5000 INJECTION INTRAVENOUS; SUBCUTANEOUS at 19:31

## 2023-08-23 RX ADMIN — OXYCODONE HYDROCHLORIDE 5 MILLIGRAM(S): 5 TABLET ORAL at 22:30

## 2023-08-23 NOTE — PROGRESS NOTE ADULT - SUBJECTIVE AND OBJECTIVE BOX
PROGRESS NOTE:     Patient is a 48y old  Male who presents with a chief complaint of Post-op SOB (22 Aug 2023 23:24)          SUBJECTIVE & OBJECTIVE:   Pt seen and examined at bedside in AM    no overnight events.   no new complaints    REVIEW OF SYSTEMS: remaining ROS negative     PHYSICAL EXAM:  VITALS:  Vital Signs Last 24 Hrs  T(C): 36.8 (23 Aug 2023 16:22), Max: 36.8 (23 Aug 2023 16:22)  T(F): 98.2 (23 Aug 2023 16:22), Max: 98.2 (23 Aug 2023 16:22)  HR: 67 (23 Aug 2023 16:22) (66 - 74)  BP: 113/78 (23 Aug 2023 16:22) (109/70 - 113/78)  BP(mean): --  RR: 18 (23 Aug 2023 16:22) (18 - 18)  SpO2: 96% (23 Aug 2023 16:22) (93% - 96%)          GENERAL: NAD,  no increased WOB  HEAD:  Atraumatic, Normocephalic  EYES: EOMI, PERRLA, conjunctiva and sclera clear  ENMT: Moist mucous membranes  NECK: Supple, No JVD  NERVOUS SYSTEM:  Alert & Oriented X3, no focal neuro deficits   CHEST/LUNG: Clear to auscultation bilaterally; No rales, rhonchi, wheezing, or rubs  HEART: Regular rate and rhythm; No murmurs, rubs, or gallops  ABDOMEN: Soft, Nontender, Nondistended; Bowel sounds present  EXTREMITIES:  2+ Peripheral Pulses b/l, No clubbing, cyanosis, calf tenderness or edema b/l      MEDICATIONS  (STANDING):  heparin   Injectable 5000 Unit(s) SubCutaneous every 12 hours  sodium chloride 0.9% lock flush 3 milliLiter(s) IV Push every 8 hours    MEDICATIONS  (PRN):  acetaminophen     Tablet .. 650 milliGRAM(s) Oral every 6 hours PRN Temp greater or equal to 38C (100.4F), Mild Pain (1 - 3)  oxyCODONE    IR 5 milliGRAM(s) Oral every 4 hours PRN Moderate Pain (4 - 6)      Allergies    No Known Allergies    Intolerances              LABS:                           14.3   10.77 )-----------( 168      ( 23 Aug 2023 07:10 )             41.3     08-23    137  |  102  |  17  ----------------------------<  96  3.6   |  31  |  0.98    Ca    8.7      23 Aug 2023 07:10  Phos  4.0     08-23  Mg     2.3     08-23        Urinalysis Basic - ( 23 Aug 2023 07:10 )    Color: x / Appearance: x / SG: x / pH: x  Gluc: 96 mg/dL / Ketone: x  / Bili: x / Urobili: x   Blood: x / Protein: x / Nitrite: x   Leuk Esterase: x / RBC: x / WBC x   Sq Epi: x / Non Sq Epi: x / Bacteria: x      CAPILLARY BLOOD GLUCOSE                    RECENT CULTURES:          RADIOLOGY & ADDITIONAL TESTS:  < from: CT Chest No Cont (08.21.23 @ 19:41) >  IMPRESSION:.    Extensive airspace disease involving all lung lobes; leading diagnostic   consideration is for infection.    Indeterminate hepatic lesions; recommend dedicated MRI abdomen to further   characterize.    --- End of Report ---    < end of copied text >  < from: Xray Chest 1 View- PORTABLE-Urgent (Xray Chest 1 View- PORTABLE-Urgent .) (08.22.23 @ 09:29) >  IMPRESSION:  Improving bilateral perihilar airspace opacities with   greater residual on the right.    --- End of Report ---      < end of copied text >        Radiology reports read and imaging reviewed  :  [ x] YES  [ ] NO  (I am not a radiologist and therefore rely on Radiologist reports to facilitate with diagnosis and treatment plans)    Consultant(s) Notes Reviewed:  [x ] YES  [ ] NO    Care Discussed with Consultants/Other Providers [x ] YES  [ ] NO  Care plan and all findings were discussed in detail with patient.  All questions and concerns addressed

## 2023-08-23 NOTE — PROGRESS NOTE ADULT - SUBJECTIVE AND OBJECTIVE BOX
24 hr events  had syncope yesterday after walking ot bathroom to urinate  states he was standing up to urinate and then felt a warmth rush to his head and then everything went dark  states he was told he passed out and was sitting on the floor of the bathroom  states he has had no prior history of syncope  did not feel SOB at the time, did not have chest pain  noted on tele monitoring to have had HR 30s during event      ROS  no fever, no chills  no HA, no dizziness  no visual changes, no auditory changes  no sore throat, no sinus congestion  no SOB, no cough  no chest pain, no palpitations  no abdominal pain, no N/V/D  mild pain at surgical site  no dysuria, no hematuria  no myalgias, no arthralgias  no swelling  no rashes, no pruritis      MEDICATIONS  (STANDING):  heparin   Injectable 5000 Unit(s) SubCutaneous every 12 hours  sodium chloride 0.9% lock flush 3 milliLiter(s) IV Push every 8 hours    MEDICATIONS  (PRN):  acetaminophen     Tablet .. 650 milliGRAM(s) Oral every 6 hours PRN Temp greater or equal to 38C (100.4F), Mild Pain (1 - 3)  oxyCODONE    IR 5 milliGRAM(s) Oral every 4 hours PRN Moderate Pain (4 - 6)        LABS                 14.3   10.77 )-----------( 168      ( 23 Aug 2023 07:10 )             41.3       08-23    137  |  102  |  17  ----------------------------<  96  3.6   |  31  |  0.98    Ca    8.7      23 Aug 2023 07:10  Phos  4.0     08-23  Mg     2.3     08-23    Respiratory Viral Panel with COVID-19 by ARRON (08.22.23 @ 15:00)    [facility]   SARS-CoV-2: NotDete    Pro-Brain Natriuretic Peptide (08.22.23 @ 06:47)    Pro-Brain Natriuretic Peptide: 32 pg/mL      IMAGING  CT chest < from: CT Chest No Cont (08.21.23 @ 19:41) >  AIRWAYS, LUNGS, PLEURA: Clear central airways. Consolidation ground-glass   opacification involving all lung lobes. No pleural effusion.    MEDIASTINUM: Normal heart size. No pericardial effusion. Thoracic aorta   normal caliber.  No large mediastinal lymph nodes.    IMAGED ABDOMEN: Multiple hepatic lesions are indeterminate; reference   left hepatic lobe 5.5 cm lesion (image 126, series 7).    SOFT TISSUES: Unremarkable.    BONES: Unremarkable.      IMPRESSION:.    Extensive airspace disease involving all lung lobes; leading diagnostic   consideration is for infection.    Indeterminate hepatic lesions; recommend dedicated MRI abdomen to further   characterize.  ------------------------------  < from: Xray Chest 1 View- PORTABLE-Urgent (Xray Chest 1 View- PORTABLE-Urgent .) (08.22.23 @ 09:29) >  Heart size and the mediastinum cannot be accurately evaluated on this projection.  There are improving bilateral perihilar airspace opacities with greater residual on the right.  No pleural effusion or pneumothorax is seen.  No acute bony abnormality is noted.      IMPRESSION:  Improving bilateral perihilar airspace opacities with   greater residual on the right.            Vital Signs Last 24 Hrs  T(C): 36.8 (23 Aug 2023 16:22), Max: 36.8 (23 Aug 2023 16:22)  T(F): 98.2 (23 Aug 2023 16:22), Max: 98.2 (23 Aug 2023 16:22)  HR: 67 (23 Aug 2023 16:22) (66 - 74)  BP: 113/78 (23 Aug 2023 16:22) (109/70 - 113/78)  RR: 18 (23 Aug 2023 16:22) (18 - 18)  SpO2: 96% (23 Aug 2023 16:22) (93% - 96%)      EXAM  GEN: NAD, young male, comfortable sitting in chair  HEENT: NC/AT , EOMI, sclera white, moist mucus membranes  CV: RRR  PULM: CTA bilaterally, no wheeze, no rhonchi, no rales  ABD: soft, NT, ND, + BS, L groin steri strips in place  EXT: no edema/cyanosis, no clubbing  NEURO: AAOx3, moving all extremities on command, no focal deficits

## 2023-08-23 NOTE — PROGRESS NOTE ADULT - SUBJECTIVE AND OBJECTIVE BOX
Postoperative Day #: 2 LIH repair with mesh  Patient seen and examined bedside resting comfortably.  No complaints offered. No flatus/BM.   Abdominal pain is well controlled.  Denies nausea, vomiting, diarrhea, fevers, chills. Tolerating diet.        T(F): 98 (08-23-23 @ 10:32), Max: 98 (08-23-23 @ 10:32)  HR: 69 (08-23-23 @ 10:32) (68 - 77)  BP: 111/69 (08-23-23 @ 10:32) (109/70 - 133/85)  RR: 18 (08-23-23 @ 10:32) (18 - 18)  SpO2: 96% (08-23-23 @ 10:32) (93% - 99%)  Wt(kg): --  CAPILLARY BLOOD GLUCOSE          PHYSICAL EXAM:  General: NAD  Neuro:  Alert & oriented x 3  HEENT: NCAT, EOMI, conjunctiva clear  CV: +S1+S2 regular rate and rhythm  Lung:respirations nonlabored, good inspiratory effort  Abdomen: soft, mildly distended, nontender. lower abdominal incision with steristrips C/D/I  Extremities: no pedal edema or calf tenderness noted     LABS:                        14.3   10.77 )-----------( 168      ( 23 Aug 2023 07:10 )             41.3     08-23    137  |  102  |  17  ----------------------------<  96  3.6   |  31  |  0.98    Ca    8.7      23 Aug 2023 07:10  Phos  4.0     08-23  Mg     2.3     08-23          < from: TTE Echo Complete w/o Contrast w/ Doppler (08.22.23 @ 13:05) >  Summary:   1. Left ventricular ejection fraction, by visual estimation, is 55 to   60%.   2. Technically adequate study.   3. Normal global left ventricular systolic function.   4. Mildly increased LV wall thickness.   5. Normal left ventricular internal cavity size.   6. Spectral Doppler shows impaired relaxation pattern of left   ventricular myocardial filling (Grade I diastolic dysfunction).   7. Normal right ventricular size and function.   8. Normal left atrial size.   9. Normal right atrial size.  10. There is no evidence of pericardial effusion.  11. Structurally normal mitral valve, with normal leaflet excursion.  12. Trace mitral valve regurgitation.  13. Mild aortic regurgitation.  14. Estimated pulmonary artery systolic pressure is 36.8 mmHg assuming a   right atrial pressure of 5 mmHg, which is consistent with borderline   pulmonary hypertension.  15. Increased relative wall thickness with normal mass index consistent   with left ventricular concentric remodeling.      [id number] Arik Tripp MD FACC, FASE, FACP  Electronically signed on 8/22/2023 at 4:34:04 PM      < end of copied text >      A/P  48M S/P left inguinal hernia repair with mesh POD#2, with post-op pulmonary edema, improved. Echo EF 55-60%, borderline pulm HTN. Maintaining sats off of oxygen.  -discussed with Pulmonology minerva Ferraro d/c with outpt Follow up. Will need repeat echo/ct outpt  - local wound care per surgery  - continue with regular diet  - c/w VTE ppx: heparin, analgesia PRN  - ambulate as tolerated  - d/c planning   - will need OP Follow up with hematology for hepatic lesions.   - will discuss with Dr Pryor      Postoperative Day #: 2 LIH repair with mesh  Patient seen and examined bedside resting comfortably.  No complaints offered. + flatus/BM.   Abdominal pain is well controlled.  Denies nausea, vomiting, diarrhea, fevers, chills. Tolerating regular diet.        T(F): 98 (08-23-23 @ 10:32), Max: 98 (08-23-23 @ 10:32)  HR: 69 (08-23-23 @ 10:32) (68 - 77)  BP: 111/69 (08-23-23 @ 10:32) (109/70 - 133/85)  RR: 18 (08-23-23 @ 10:32) (18 - 18)  SpO2: 96% (08-23-23 @ 10:32) (93% - 99%)  Wt(kg): --  CAPILLARY BLOOD GLUCOSE          PHYSICAL EXAM:  General: NAD  Neuro:  Alert & oriented x 3  HEENT: NCAT, EOMI, conjunctiva clear  CV: +S1+S2 regular rate and rhythm  Lung:respirations nonlabored, good inspiratory effort  Abdomen: soft, mildly distended, nontender. lower abdominal incision with steristrips C/D/I  Extremities: no pedal edema or calf tenderness noted     LABS:                        14.3   10.77 )-----------( 168      ( 23 Aug 2023 07:10 )             41.3     08-23    137  |  102  |  17  ----------------------------<  96  3.6   |  31  |  0.98    Ca    8.7      23 Aug 2023 07:10  Phos  4.0     08-23  Mg     2.3     08-23          < from: TTE Echo Complete w/o Contrast w/ Doppler (08.22.23 @ 13:05) >  Summary:   1. Left ventricular ejection fraction, by visual estimation, is 55 to   60%.   2. Technically adequate study.   3. Normal global left ventricular systolic function.   4. Mildly increased LV wall thickness.   5. Normal left ventricular internal cavity size.   6. Spectral Doppler shows impaired relaxation pattern of left   ventricular myocardial filling (Grade I diastolic dysfunction).   7. Normal right ventricular size and function.   8. Normal left atrial size.   9. Normal right atrial size.  10. There is no evidence of pericardial effusion.  11. Structurally normal mitral valve, with normal leaflet excursion.  12. Trace mitral valve regurgitation.  13. Mild aortic regurgitation.  14. Estimated pulmonary artery systolic pressure is 36.8 mmHg assuming a   right atrial pressure of 5 mmHg, which is consistent with borderline   pulmonary hypertension.  15. Increased relative wall thickness with normal mass index consistent   with left ventricular concentric remodeling.      [id number] Arik Tripp MD FACC, FASE, FACP  Electronically signed on 8/22/2023 at 4:34:04 PM      < end of copied text >      A/P  48M S/P left inguinal hernia repair with mesh POD#2, with post-op pulmonary edema, improved. Echo EF 55-60%, borderline pulm HTN. Maintaining sats off of oxygen.  -discussed with Pulmonology Dr Henry, minerva d/c with outpt Follow up. Will need repeat echo/ct outpt  - local wound care per surgery  - continue with regular diet  - c/w VTE ppx: heparin, analgesia PRN  - ambulate as tolerated  - d/c planning   - will need OP Follow up with hematology for hepatic lesions.   -

## 2023-08-24 ENCOUNTER — TRANSCRIPTION ENCOUNTER (OUTPATIENT)
Age: 48
End: 2023-08-24

## 2023-08-24 VITALS — HEART RATE: 84 BPM

## 2023-08-24 PROBLEM — N40.0 BENIGN PROSTATIC HYPERPLASIA WITHOUT LOWER URINARY TRACT SYMPTOMS: Chronic | Status: ACTIVE | Noted: 2023-08-07

## 2023-08-24 LAB
ANION GAP SERPL CALC-SCNC: 3 MMOL/L — LOW (ref 5–17)
BUN SERPL-MCNC: 16 MG/DL — SIGNIFICANT CHANGE UP (ref 7–23)
CALCIUM SERPL-MCNC: 8.8 MG/DL — SIGNIFICANT CHANGE UP (ref 8.5–10.1)
CHLORIDE SERPL-SCNC: 103 MMOL/L — SIGNIFICANT CHANGE UP (ref 96–108)
CO2 SERPL-SCNC: 32 MMOL/L — HIGH (ref 22–31)
CREAT SERPL-MCNC: 0.99 MG/DL — SIGNIFICANT CHANGE UP (ref 0.5–1.3)
EGFR: 93 ML/MIN/1.73M2 — SIGNIFICANT CHANGE UP
GLUCOSE SERPL-MCNC: 89 MG/DL — SIGNIFICANT CHANGE UP (ref 70–99)
HCT VFR BLD CALC: 40.3 % — SIGNIFICANT CHANGE UP (ref 39–50)
HGB BLD-MCNC: 14.4 G/DL — SIGNIFICANT CHANGE UP (ref 13–17)
MAGNESIUM SERPL-MCNC: 2.4 MG/DL — SIGNIFICANT CHANGE UP (ref 1.6–2.6)
MCHC RBC-ENTMCNC: 27.2 PG — SIGNIFICANT CHANGE UP (ref 27–34)
MCHC RBC-ENTMCNC: 35.7 G/DL — SIGNIFICANT CHANGE UP (ref 32–36)
MCV RBC AUTO: 76 FL — LOW (ref 80–100)
NRBC # BLD: 0 /100 WBCS — SIGNIFICANT CHANGE UP (ref 0–0)
PHOSPHATE SERPL-MCNC: 3.7 MG/DL — SIGNIFICANT CHANGE UP (ref 2.5–4.5)
PLATELET # BLD AUTO: 173 K/UL — SIGNIFICANT CHANGE UP (ref 150–400)
POTASSIUM SERPL-MCNC: 4.1 MMOL/L — SIGNIFICANT CHANGE UP (ref 3.5–5.3)
POTASSIUM SERPL-SCNC: 4.1 MMOL/L — SIGNIFICANT CHANGE UP (ref 3.5–5.3)
RBC # BLD: 5.3 M/UL — SIGNIFICANT CHANGE UP (ref 4.2–5.8)
RBC # FLD: 14.3 % — SIGNIFICANT CHANGE UP (ref 10.3–14.5)
SODIUM SERPL-SCNC: 138 MMOL/L — SIGNIFICANT CHANGE UP (ref 135–145)
WBC # BLD: 9.01 K/UL — SIGNIFICANT CHANGE UP (ref 3.8–10.5)
WBC # FLD AUTO: 9.01 K/UL — SIGNIFICANT CHANGE UP (ref 3.8–10.5)

## 2023-08-24 PROCEDURE — 99232 SBSQ HOSP IP/OBS MODERATE 35: CPT

## 2023-08-24 PROCEDURE — 99239 HOSP IP/OBS DSCHRG MGMT >30: CPT

## 2023-08-24 RX ORDER — ACETAMINOPHEN 500 MG
2 TABLET ORAL
Qty: 0 | Refills: 0 | DISCHARGE
Start: 2023-08-24

## 2023-08-24 RX ORDER — OXYCODONE AND ACETAMINOPHEN 5; 325 MG/1; MG/1
1 TABLET ORAL
Qty: 30 | Refills: 0
Start: 2023-08-24 | End: 2023-08-28

## 2023-08-24 RX ORDER — OXYCODONE HYDROCHLORIDE 5 MG/1
1 TABLET ORAL
Qty: 6 | Refills: 0
Start: 2023-08-24

## 2023-08-24 RX ADMIN — HEPARIN SODIUM 5000 UNIT(S): 5000 INJECTION INTRAVENOUS; SUBCUTANEOUS at 05:42

## 2023-08-24 RX ADMIN — SODIUM CHLORIDE 3 MILLILITER(S): 9 INJECTION INTRAMUSCULAR; INTRAVENOUS; SUBCUTANEOUS at 05:40

## 2023-08-24 NOTE — PROGRESS NOTE ADULT - ASSESSMENT
48 yrs old male with PMH of BPH presented for inguinal hernia repair. Post op pt was hypoxic and was found to have pulm edema in CXR.    Acute hypoxic respiratory failure   2/2 acute pulmonary edema:  - Resp status significantly improved   - CXR and CT chest with extensive BL infiltrate, repeat CXR today improved   - S/P Lasix IV, can hold now  - off IVF   - 2D Echo with LVH, diastolic dysfunction, borderline pulm HTN  - So2 ok in RA, check after ambulation  - Will need repeat CT chest and echo outpt    Syncope:  - As per documentation HR was 36 in tele  - Likely vasovagal syncope  - Obtain 12 lead EKG, continue tele monitor  - If bradycardia persist will need cardio eval    S/P inguinal hernia repair:  - Management sx team    DVT ppx per primary team
48M S/P left inguinal hernia repair with mesh POD#3, with post-op pulmonary edema, improved. Echo EF 55-60%, borderline pulm HTN. Maintaining sats off of oxygen.    - discharge planning pending PT evaluation  - outpatient f/u with pulm upon discharge. Will need repeat echo/ct outpt  - continue with regular diet  - dvt prophylaxis
48M non smoker with PMH BPH, R inguinal hernia repair here s/p left inguinal hernia repair with post op course complicated by hypoxia (O2 sat 88%) and CXR with bilateral infiltrates.       DX: acute post operative hypoxic respiratory failure      - POD #3  - CXR and CT chest with bilateral opacities noted  - no respiratory complaints prior to surgery and was oxygenating well pre-op  - he may have had while coming out of anesthesia laryngospasm and subsequent negative pressure pulmonary edema leading to SOB and hypoxia  - he does not appear ill or toxic appearing  - weaned off nasal cannula   - doing well on room air, no oxygen desaturation with ambulation/exertion on room air  - not in respiratory distress, no accessory muscle use  - s/p lasix diuresis with improvement in repeat CXR  - initial SOB/hypoxia likely in setting of acute pulmonary edema  - symptoms have quickly resolved with diuresis which is consistent with picture of pulmonary edema  - ECHO with normal EF, but with LVH and diastolic dysfunction reported as well as borderline pulmonary HTN  - can have follow up echo to assess pulmonary HTN outpatient, cards eval outpatient  - syncope and bradycardia likely vasovagal response with micturition the other day   - RVP negative  - afebrile and resolution of leukocytosis without intervention: observing off antibx. initial leukocytosis likely reactive post surgery  - goal to maintain O2 sat > 90%  - incentive spirometer to prevent post op atelectasis  - improved sore throat: likely irritation from intubation/extubation, supportive care, Cepacol as needed  - DVT ppx  - no objections from pulmonary standpoint for discharge home  - pt is scheduled for a virtual pulmonary appointment on 08/28 @ 09:15 AM w/ Dr. El. Follow up care will be established at the time of the virtual appointment.pt receive a text/email invite for his telehealth session. Address and phone to pulmonary office: [address] [place] [address], [address] [place]. Tel [phone]          
48 yrs old male with PMH of BPH presented for inguinal hernia repair. Post op pt was hypoxic and was found to have pulm edema in CXR.    Acute hypoxic respiratory failure   2/2 acute pulmonary edema:  - Resp status significantly improved   - CXR and CT chest with extensive BL infiltrate, repeat CXR today improved   - S/P Lasix IV, can hold now  - off IVF   - 2D Echo with LVH, diastolic dysfunction, borderline pulm HTN  - So2 ok in RA, check after ambulation  - Will need repeat CT chest and echo outpt    Syncope:  - As per documentation HR was 36 in tele  - Likely vasovagal syncope  - No arrthymia appreciated on tele for past 24 hrs  - f/u PT eval    S/P inguinal hernia repair:  - Management sx team    DVT ppx per primary team
48M non smoker with PMH BPH, R inguinal hernia repair here s/p left inguinal hernia repair with post op course complicated by hypoxia (O2 sat 88%) and CXR with bilateral infiltrates.       DX: acute post operative hypoxic respiratory failure      - POD #2  - improved SOB  - no respiratory complaints  - syncopal event yesterday noted, no further syncope or bradyarrhythmias  - CXR and CT chest with bilateral opacities noted  - possible that while coming out of anesthesia he developed laryngospasm and subsequent negative pressure pulmonary edema leading to SOB and hypoxia  - he does not appear ill or toxic appearing  - weaned off nasal cannula   - doing well on room air  - not in respiratory distress, no accessory muscle use  - s/p lasix diuresis with improvement in repeat CXR  - initial SOB/hypoxia likely in setting of acute pulmonary edema  - symptoms have quickly resolved with diuresis which is consistent with picture of pulmonary edema  - will need to ambulate patient and see if he has any episodes of syncope, SOB/NARVAEZ, oxygen desaturation, dizziness  - ECHO with normal EF, but with LVH and diastolic dysfunction reported as well as borderline pulmonary HTN  - can have follow up echo to assess pulmonary HTN outpatient, cards eval outpatient ?  - syncope and bradycardia likely vasovagal response with micturition the other day, orthostatic hypotention a possibility too with syncope  - RVP negative  - afebrile and WBC downtrending without intervention: observing off antibx. initial leukocytosis likely reactive post surgery  - goal to maintain O2 sat > 90%  - incentive spirometer to prevent post op atelectasis  - improved sore throat: likely irritation from intubation/extubation, supportive care, Cepacol as needed  - incidental hepatic lesions noted on CT chest imaging - will need further work up and follow up (? outpatient)  - DVT ppx  - no objections from pulmonary standpoint for discharge home if pt can tolerate ambulation without desaturation episodes or any further syncopal episodes  - can have pt followup in pulmonary office: [address] [facility], [address] [place]. Tel [phone]. will arrange for a tele pulmonary visit upon discharge  - d/w surgical PA          
48M non smoker with PMH BPH, R inguinal hernia repair here s/p left inguinal hernia repair with post op course complicated by hypoxia (O2 sat 88%) and CXR with bilateral infiltrates.       DX: acute post operative hypoxic respiratory failure    - POD #1  - CXR and CT chest with bilateral opacities noted  - he did not have respiratory complaints prior to surgery and was oxygenating well   - he may have had while coming out of anesthesia laryngospasm and subsequent negative pressure pulmonary edema leading to SOB and hypoxia  - he does not appear ill or toxic appearing  - weaned off nasal cannula to room air yesterday after admission to medical floor, O2 reportedly dropped to the low 90s and was placed back on O2 supplementation overnight  - weaned again to RA  - check O2 sat on RA and with ambulation to determine O2 needs  - not in respiratory distress, no accessory muscle use  - lung ultrasound yesterday with no significant signs of interstitial edema/fluid  - there are no prior CXR to compare to  - receiving lasix and diuresing well, can likely hold further lasix for now  - repeat CXR this morning looks improved by my eyes, await official radiology read  - ECHO with normal EF, but with LVH and diastolic dysfunction reported  - SOB/hypoxia likely in setting of acute pulmonary edema  - symptoms have quickly resolved with diuresis which is consistent with picture of pulmonary edema  - follow up RVP to rule out potential infectious etiologies of pulmonary opacities, observe off antibx as there is low suspicion for bacterial infection  - goal to maintain O2 sat > 90%  - incentive spirometer to prevent post op atelectasis  - complaining of sore throat: likely irritation from intubation/extubation, supportive care, Cepacol as needed  - DVT ppx  - if he remains hemodynamically stable on RA no objections for d/c planning  - d/w surgery PA

## 2023-08-24 NOTE — DISCHARGE NOTE NURSING/CASE MANAGEMENT/SOCIAL WORK - NSDCPEFALRISK_GEN_ALL_CORE
For information on Fall & Injury Prevention, visit: [email or web address] OR  [email or web address] OR  https://www.cdc.gov/steadi/patient.html

## 2023-08-24 NOTE — PROGRESS NOTE ADULT - SUBJECTIVE AND OBJECTIVE BOX
24 hr events:  walked with PT today  did not have any further syncopal event or bradycardic event  denies SOB  no cough, no hemoptysis  no CP  mild pain to surgical groin site when moving  no BM  sore throat improved      ## ROS:  no fever, no chills  no HA, no dizziness  no visual changes, no auditory changes  no sore throat, no sinus congestion  no SOB, no cough  no chest pain, no palpitations  no abdominal pain, no N/V/D  no dysuria, no hematuria  no myalgias, no arthralgias  no swelling  no rashes, no pruritis      ## Labs:  CBC:                        14.4   9.01  )-----------( 173      ( 24 Aug 2023 07:15 )             40.3     Chem:  08-24    138  |  103  |  16  ----------------------------<  89  4.1   |  32<H>  |  0.99    Ca    8.8      24 Aug 2023 07:15  Phos  3.7     08-24  Mg     2.4     08-24      Respiratory Viral Panel with COVID-19 by ARRON (08.22.23 @ 15:00)    [facility]   SARS-CoV-2: NotDete    Pro-Brain Natriuretic Peptide (08.22.23 @ 06:47)    Pro-Brain Natriuretic Peptide: 32 pg/mL        ## Imaging:  CXR < from: Xray Chest 1 View- PORTABLE-Urgent (Xray Chest 1 View- PORTABLE-Urgent .) (08.22.23 @ 09:29) >  Improving bilateral perihilar airspace opacities with greater residual on the right.        ## Medications:  heparin   Injectable 5000 Unit(s) SubCutaneous every 12 hours      acetaminophen     Tablet .. 650 milliGRAM(s) Oral every 6 hours PRN  oxyCODONE    IR 5 milliGRAM(s) Oral every 4 hours PRN      ## Vitals:  T(C): 37 (08-24-23 @ 09:48), Max: 37 (08-24-23 @ 09:48)  HR: 78 (08-24-23 @ 09:48) (67 - 78)  BP: 122/82 (08-24-23 @ 09:48) (107/69 - 122/82)  RR: 18 (08-24-23 @ 09:48) (18 - 18)  SpO2: 97% (08-24-23 @ 09:48) (96% - 98%)      08-23 @ 07:01  -  08-24 @ 07:00  --------------------------------------------------------  IN: 660 mL / OUT: 200 mL / NET: 460 mL          ## P/E:  Gen: sitting comfortably in bed in no apparent distress  HEENT: NC/AT, EOMI, sclera white, moist mucus membrane  Resp: CTA B/L, no wheeze, no rhonchi  CVS: S1S2   Abd: soft NT/ND +BS, L groin dressing in place (not soaked)  Ext: no c/c/e  Neuro: A&Ox3

## 2023-08-24 NOTE — PHYSICAL THERAPY INITIAL EVALUATION ADULT - PERTINENT HX OF CURRENT PROBLEM, REHAB EVAL
pt s/p repair incarcerated left inguinal hernia with mash. Cosentyx Counseling:  I discussed with the patient the risks of Cosentyx including but not limited to worsening of Crohn's disease, immunosuppression, allergic reactions and infections.  The patient understands that monitoring is required including a PPD at baseline and must alert us or the primary physician if symptoms of infection or other concerning signs are noted.

## 2023-08-24 NOTE — PROGRESS NOTE ADULT - SUBJECTIVE AND OBJECTIVE BOX
INTERVAL HPI/OVERNIGHT EVENTS:  pt seen and examined at bedside. no acute complaints.  denied shortness of breathe, nausea, vomiting  pain well controlled    MEDICATIONS  (STANDING):  heparin   Injectable 5000 Unit(s) SubCutaneous every 12 hours  sodium chloride 0.9% lock flush 3 milliLiter(s) IV Push every 8 hours    MEDICATIONS  (PRN):  acetaminophen     Tablet .. 650 milliGRAM(s) Oral every 6 hours PRN Temp greater or equal to 38C (100.4F), Mild Pain (1 - 3)  oxyCODONE    IR 5 milliGRAM(s) Oral every 4 hours PRN Moderate Pain (4 - 6)      Vital Signs Last 24 Hrs  T(C): 37 (24 Aug 2023 09:48), Max: 37 (24 Aug 2023 09:48)  T(F): 98.6 (24 Aug 2023 09:48), Max: 98.6 (24 Aug 2023 09:48)  HR: 78 (24 Aug 2023 09:48) (67 - 78)  BP: 122/82 (24 Aug 2023 09:48) (107/69 - 122/82)  RR: 18 (24 Aug 2023 09:48) (18 - 18)  SpO2: 97% (24 Aug 2023 09:48) (96% - 98%)    Parameters below as of 24 Aug 2023 06:59  Patient On (Oxygen Delivery Method): room air    Physical:  General: A&Ox3. NAD.  Chest: respiration unlabored  Abdomen: Soft nondistended, nontender.  dressing c/d/i  Ext: no calf tenderness, no edema    I&O's Detail    23 Aug 2023 07:01  -  24 Aug 2023 07:00  --------------------------------------------------------  IN:    Oral Fluid: 660 mL  Total IN: 660 mL    OUT:    Voided (mL): 200 mL  Total OUT: 200 mL    Total NET: 460 mL      24 Aug 2023 07:01  -  24 Aug 2023 11:53  --------------------------------------------------------  IN:    Oral Fluid: 210 mL  Total IN: 210 mL    OUT:  Total OUT: 0 mL    Total NET: 210 mL      LABS:                        14.4   9.01  )-----------( 173      ( 24 Aug 2023 07:15 )             40.3             08-24    138  |  103  |  16  ----------------------------<  89  4.1   |  32<H>  |  0.99    Ca    8.8      24 Aug 2023 07:15  Phos  3.7     08-24  Mg     2.4     08-24

## 2023-08-24 NOTE — PHYSICAL THERAPY INITIAL EVALUATION ADULT - ADDITIONAL COMMENTS
pt lives in a private house with spouse, has 4 steps with bjorn hand rail far apart to get into house and 13 steps with right hand rail to bedroom, pt on cardiac monitor s/p hernia repair, pt indep in bed mob, transfers and able to amb without AD, and negotiate 1 flight of stairs with no AD, pt educated on proper gait and balance. Educated on the use of incentive spirometer. verbalized understanding. pain is 5/10

## 2023-08-24 NOTE — DISCHARGE NOTE NURSING/CASE MANAGEMENT/SOCIAL WORK - PATIENT PORTAL LINK FT
You can access the FollowMyHealth Patient Portal offered by [facility] by registering at the following website: [email or web address]. By joining [employer]’s FollowMyHealth portal, you will also be able to view your health information using other applications (apps) compatible with our system.

## 2023-08-28 ENCOUNTER — APPOINTMENT (OUTPATIENT)
Dept: PULMONOLOGY | Facility: CLINIC | Age: 48
End: 2023-08-28
Payer: COMMERCIAL

## 2023-08-28 DIAGNOSIS — J81.0 ACUTE PULMONARY EDEMA: ICD-10-CM

## 2023-08-28 PROBLEM — Z00.00 ENCOUNTER FOR PREVENTIVE HEALTH EXAMINATION: Status: ACTIVE | Noted: 2023-08-28

## 2023-08-28 PROCEDURE — 99495 TRANSJ CARE MGMT MOD F2F 14D: CPT | Mod: 95

## 2023-08-30 NOTE — ASSESSMENT
[FreeTextEntry1] : Mr. Curiel presented to [place] for L inguinal hernia repair w/ mesh. He devleoped post operative pulmonary edema and was admitted. He had bilateral pulmonary infiltrates on CT and CXR. He was treated w/ lasix briefly but developed vasovagal response and it was discontinued. Oxygenation improved. TTE showed possible pulmonary hypertension. He was discharged without oxygen. He feels improved but still somewhat dyspneic upon exertion. No other complaints. No cough, no wheeze, no fevers  - PFTs - repeat CT chest to ensure resolution  - has fu w/ cardiology

## 2023-08-30 NOTE — HISTORY OF PRESENT ILLNESS
[[facility]] : Post-hospitalization from [facility] [Other: _____] : [unfilled] [Yes] : Yes [Admitted on: ___] : The patient was admitted on [unfilled] [Discharged on ___] : discharged on [unfilled] [Discharge Summary] : discharge summary [Radiology Findings] : radiology findings [Discharge Med List] : discharge medication list [Patient Contacted By: ____] : and contacted by [unfilled] [Home] : at home, [unfilled] , at the time of the visit. [Medical Office: ([facility])___] : at the medical office located in  [Verbal consent obtained from patient] : the patient, [unfilled] [FreeTextEntry2] : Mr. Curiel presented to [place] for L inguinal hernia repair w/ mesh. He devleoped post operative pulmonary edema and was admitted. He had bilateral pulmonary infiltrates on CT and CXR. He was treated w/ lasix briefly but developed vasovagal response and it was discontinued. Oxygenation improved. TTE showed possible pulmonary hypertension. He was discharged without oxygen. He feels improved but still somewhat dyspneic upon exertion. No other complaints. No cough, no wheeze, no fevers  He has an appointment with cardiology already set up next week. Will fu in our office for in person visit.

## 2023-08-30 NOTE — PHYSICAL EXAM
[de-identified] :  On exam via video he is awake, alert, and in no acute distress. He is speaking in full sentences. There is no overt wheezing or frequent cough. He does not appear dyspneic or in any respiratory distress. He is answering questions appropriately.
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
## 2023-09-01 DIAGNOSIS — K76.89 OTHER SPECIFIED DISEASES OF LIVER: ICD-10-CM

## 2023-09-01 DIAGNOSIS — Z82.49 FAMILY HISTORY OF ISCHEMIC HEART DISEASE AND OTHER DISEASES OF THE CIRCULATORY SYSTEM: ICD-10-CM
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
## 2023-09-01 DIAGNOSIS — K40.30 UNILATERAL INGUINAL HERNIA, WITH OBSTRUCTION, WITHOUT GANGRENE, NOT SPECIFIED AS RECURRENT: ICD-10-CM

## 2023-09-01 DIAGNOSIS — J95.821 ACUTE POSTPROCEDURAL RESPIRATORY FAILURE: ICD-10-CM

## 2023-09-01 DIAGNOSIS — J81.0 ACUTE PULMONARY EDEMA: ICD-10-CM

## 2023-09-01 DIAGNOSIS — R00.1 BRADYCARDIA, UNSPECIFIED: ICD-10-CM

## 2023-09-01 DIAGNOSIS — I27.20 PULMONARY HYPERTENSION, UNSPECIFIED: ICD-10-CM

## 2023-09-01 DIAGNOSIS — N40.0 BENIGN PROSTATIC HYPERPLASIA WITHOUT LOWER URINARY TRACT SYMPTOMS: ICD-10-CM

## 2023-09-01 DIAGNOSIS — J95.89 OTHER POSTPROCEDURAL COMPLICATIONS AND DISORDERS OF RESPIRATORY SYSTEM, NOT ELSEWHERE CLASSIFIED: ICD-10-CM

## 2024-12-23 NOTE — PHYSICAL THERAPY INITIAL EVALUATION ADULT - PAIN SENSATION, RUE, REHAB EVAL
Chronic Disease Management  Called patient to complete Pulmonary Disease Management Questionnaire.  Patient has increase cough and congestion (clear thick mucus)  Using Albuterol inhaler 2 times daily  Advised him to continue to use albuterol as needed.      Will send to pulmonary provider.   Time  spent with patient:  Minutes  
within normal limits

## (undated) DEVICE — VENODYNE/SCD SLEEVE CALF MEDIUM

## (undated) DEVICE — DRSG MASTISOL

## (undated) DEVICE — ELCTR STRYKER NEPTUNE SMOKE EVACUATION PENCIL (GREEN)

## (undated) DEVICE — SPONGE PEANUT AUTO COUNT

## (undated) DEVICE — SUT VICRYL PLUS 4-0 18" PS-2 UNDYED

## (undated) DEVICE — DRAIN PENROSE .25" X 12" SILICONE

## (undated) DEVICE — GLV 7.5 PROTEXIS (WHITE)

## (undated) DEVICE — DRSG TEGADERM 4X4.75"

## (undated) DEVICE — FRA-ESU BOVIE FORCE TRIAD T7J19717DX: Type: DURABLE MEDICAL EQUIPMENT

## (undated) DEVICE — WARMING BLANKET UPPER ADULT

## (undated) DEVICE — PACK MINOR WITH LAP

## (undated) DEVICE — SUT VICRYL 2-0 54" REEL

## (undated) DEVICE — DRAPE TOWEL BLUE 17" X 27"

## (undated) DEVICE — GOWN LG

## (undated) DEVICE — SUT VICRYL 3-0 18" SH (POP-OFF)

## (undated) DEVICE — SUT VICRYL 2-0 27" CT-2 UNDYED

## (undated) DEVICE — SUT VICRYL 0 27" CT-2